# Patient Record
Sex: FEMALE | Race: BLACK OR AFRICAN AMERICAN | Employment: FULL TIME | URBAN - METROPOLITAN AREA
[De-identification: names, ages, dates, MRNs, and addresses within clinical notes are randomized per-mention and may not be internally consistent; named-entity substitution may affect disease eponyms.]

---

## 2018-11-23 ENCOUNTER — HOSPITAL ENCOUNTER (OUTPATIENT)
Dept: ULTRASOUND IMAGING | Age: 23
Discharge: HOME | End: 2018-11-23
Payer: COMMERCIAL

## 2018-11-23 DIAGNOSIS — N93.8 DUB (DYSFUNCTIONAL UTERINE BLEEDING): ICD-10-CM

## 2018-11-23 PROCEDURE — 76856 US EXAM PELVIC COMPLETE: CPT

## 2018-11-23 PROCEDURE — 76830 TRANSVAGINAL US NON-OB: CPT

## 2019-01-06 ENCOUNTER — APPOINTMENT (OUTPATIENT)
Dept: ULTRASOUND IMAGING | Age: 24
DRG: 743 | End: 2019-01-06
Attending: PHYSICIAN ASSISTANT
Payer: COMMERCIAL

## 2019-01-06 ENCOUNTER — HOSPITAL ENCOUNTER (INPATIENT)
Age: 24
LOS: 2 days | Discharge: HOME | DRG: 743 | End: 2019-01-10
Attending: EMERGENCY MEDICINE | Admitting: OBSTETRICS & GYNECOLOGY
Payer: COMMERCIAL

## 2019-01-06 DIAGNOSIS — R10.2 PELVIC PAIN: ICD-10-CM

## 2019-01-06 DIAGNOSIS — D25.9 UTERINE LEIOMYOMA, UNSPECIFIED LOCATION: Primary | ICD-10-CM

## 2019-01-06 DIAGNOSIS — N83.201 RIGHT OVARIAN CYST: ICD-10-CM

## 2019-01-06 LAB
BASOPHILS # BLD: 0 K/UL (ref 0–0.1)
BASOPHILS NFR BLD: 0 % (ref 0–1)
CLUE CELLS VAG QL WET PREP: NORMAL
COMMENT, HOLDF: NORMAL
DIFFERENTIAL METHOD BLD: NORMAL
EOSINOPHIL # BLD: 0.1 K/UL (ref 0–0.4)
EOSINOPHIL NFR BLD: 1 % (ref 0–7)
ERYTHROCYTE [DISTWIDTH] IN BLOOD BY AUTOMATED COUNT: 12.6 % (ref 11.5–14.5)
HCG SERPL QL: NEGATIVE
HCT VFR BLD AUTO: 37.6 % (ref 35–47)
HGB BLD-MCNC: 12.6 G/DL (ref 11.5–16)
IMM GRANULOCYTES # BLD: 0 K/UL (ref 0–0.04)
IMM GRANULOCYTES NFR BLD AUTO: 0 % (ref 0–0.5)
KOH PREP SPEC: NORMAL
LYMPHOCYTES # BLD: 2.6 K/UL (ref 0.8–3.5)
LYMPHOCYTES NFR BLD: 26 % (ref 12–49)
MCH RBC QN AUTO: 30.1 PG (ref 26–34)
MCHC RBC AUTO-ENTMCNC: 33.5 G/DL (ref 30–36.5)
MCV RBC AUTO: 89.7 FL (ref 80–99)
MONOCYTES # BLD: 0.6 K/UL (ref 0–1)
MONOCYTES NFR BLD: 6 % (ref 5–13)
NEUTS SEG # BLD: 6.6 K/UL (ref 1.8–8)
NEUTS SEG NFR BLD: 67 % (ref 32–75)
NRBC # BLD: 0 K/UL (ref 0–0.01)
NRBC BLD-RTO: 0 PER 100 WBC
PLATELET # BLD AUTO: 308 K/UL (ref 150–400)
PMV BLD AUTO: 9.6 FL (ref 8.9–12.9)
RBC # BLD AUTO: 4.19 M/UL (ref 3.8–5.2)
SAMPLES BEING HELD,HOLD: NORMAL
SERVICE CMNT-IMP: NORMAL
T VAGINALIS VAG QL WET PREP: NORMAL
WBC # BLD AUTO: 9.9 K/UL (ref 3.6–11)

## 2019-01-06 PROCEDURE — 74011250636 HC RX REV CODE- 250/636: Performed by: PHYSICIAN ASSISTANT

## 2019-01-06 PROCEDURE — 87210 SMEAR WET MOUNT SALINE/INK: CPT

## 2019-01-06 PROCEDURE — 96376 TX/PRO/DX INJ SAME DRUG ADON: CPT

## 2019-01-06 PROCEDURE — 87491 CHLMYD TRACH DNA AMP PROBE: CPT

## 2019-01-06 PROCEDURE — 74011000250 HC RX REV CODE- 250: Performed by: OBSTETRICS & GYNECOLOGY

## 2019-01-06 PROCEDURE — 99285 EMERGENCY DEPT VISIT HI MDM: CPT

## 2019-01-06 PROCEDURE — 85025 COMPLETE CBC W/AUTO DIFF WBC: CPT

## 2019-01-06 PROCEDURE — 96375 TX/PRO/DX INJ NEW DRUG ADDON: CPT

## 2019-01-06 PROCEDURE — 99218 HC RM OBSERVATION: CPT

## 2019-01-06 PROCEDURE — 96374 THER/PROPH/DIAG INJ IV PUSH: CPT

## 2019-01-06 PROCEDURE — 76856 US EXAM PELVIC COMPLETE: CPT

## 2019-01-06 PROCEDURE — 74011250636 HC RX REV CODE- 250/636: Performed by: OBSTETRICS & GYNECOLOGY

## 2019-01-06 PROCEDURE — 36415 COLL VENOUS BLD VENIPUNCTURE: CPT

## 2019-01-06 PROCEDURE — 74011250637 HC RX REV CODE- 250/637: Performed by: PHYSICIAN ASSISTANT

## 2019-01-06 PROCEDURE — 96361 HYDRATE IV INFUSION ADD-ON: CPT

## 2019-01-06 PROCEDURE — 84703 CHORIONIC GONADOTROPIN ASSAY: CPT

## 2019-01-06 RX ORDER — NORETHINDRONE ACETATE AND ETHINYL ESTRADIOL 1.5-30(21)
1 KIT ORAL DAILY
COMMUNITY

## 2019-01-06 RX ORDER — OXYCODONE AND ACETAMINOPHEN 5; 325 MG/1; MG/1
1 TABLET ORAL
Status: COMPLETED | OUTPATIENT
Start: 2019-01-06 | End: 2019-01-06

## 2019-01-06 RX ORDER — ONDANSETRON 4 MG/1
4 TABLET, ORALLY DISINTEGRATING ORAL
Status: ON HOLD | COMMUNITY
End: 2019-01-10 | Stop reason: SDUPTHER

## 2019-01-06 RX ORDER — KETOROLAC TROMETHAMINE 30 MG/ML
15 INJECTION, SOLUTION INTRAMUSCULAR; INTRAVENOUS
Status: COMPLETED | OUTPATIENT
Start: 2019-01-06 | End: 2019-01-06

## 2019-01-06 RX ORDER — KETOROLAC TROMETHAMINE 10 MG/1
10 TABLET, FILM COATED ORAL
Qty: 15 TAB | Refills: 0 | Status: SHIPPED | OUTPATIENT
Start: 2019-01-06

## 2019-01-06 RX ORDER — SODIUM CHLORIDE, SODIUM LACTATE, POTASSIUM CHLORIDE, CALCIUM CHLORIDE 600; 310; 30; 20 MG/100ML; MG/100ML; MG/100ML; MG/100ML
125 INJECTION, SOLUTION INTRAVENOUS CONTINUOUS
Status: DISCONTINUED | OUTPATIENT
Start: 2019-01-06 | End: 2019-01-09

## 2019-01-06 RX ORDER — HYDROCODONE BITARTRATE AND ACETAMINOPHEN 5; 325 MG/1; MG/1
1 TABLET ORAL
Qty: 12 TAB | Refills: 0 | Status: SHIPPED | OUTPATIENT
Start: 2019-01-06

## 2019-01-06 RX ORDER — HYDROMORPHONE HYDROCHLORIDE 2 MG/ML
1 INJECTION, SOLUTION INTRAMUSCULAR; INTRAVENOUS; SUBCUTANEOUS ONCE
Status: COMPLETED | OUTPATIENT
Start: 2019-01-06 | End: 2019-01-06

## 2019-01-06 RX ORDER — HYDROMORPHONE HYDROCHLORIDE 2 MG/ML
1 INJECTION, SOLUTION INTRAMUSCULAR; INTRAVENOUS; SUBCUTANEOUS
Status: DISCONTINUED | OUTPATIENT
Start: 2019-01-06 | End: 2019-01-10 | Stop reason: HOSPADM

## 2019-01-06 RX ORDER — DICYCLOMINE HYDROCHLORIDE 20 MG/1
20 TABLET ORAL
COMMUNITY

## 2019-01-06 RX ORDER — ONDANSETRON 2 MG/ML
4 INJECTION INTRAMUSCULAR; INTRAVENOUS
Status: DISCONTINUED | OUTPATIENT
Start: 2019-01-06 | End: 2019-01-09 | Stop reason: SDUPTHER

## 2019-01-06 RX ORDER — IBUPROFEN 400 MG/1
800 TABLET ORAL
Status: DISCONTINUED | OUTPATIENT
Start: 2019-01-06 | End: 2019-01-10 | Stop reason: HOSPADM

## 2019-01-06 RX ORDER — ONDANSETRON 2 MG/ML
4 INJECTION INTRAMUSCULAR; INTRAVENOUS
Status: COMPLETED | OUTPATIENT
Start: 2019-01-06 | End: 2019-01-06

## 2019-01-06 RX ORDER — MORPHINE SULFATE 2 MG/ML
4 INJECTION, SOLUTION INTRAMUSCULAR; INTRAVENOUS
Status: COMPLETED | OUTPATIENT
Start: 2019-01-06 | End: 2019-01-06

## 2019-01-06 RX ADMIN — SODIUM CHLORIDE, SODIUM LACTATE, POTASSIUM CHLORIDE, AND CALCIUM CHLORIDE 125 ML/HR: 600; 310; 30; 20 INJECTION, SOLUTION INTRAVENOUS at 18:14

## 2019-01-06 RX ADMIN — SODIUM CHLORIDE 1000 ML: 900 INJECTION, SOLUTION INTRAVENOUS at 16:20

## 2019-01-06 RX ADMIN — OXYCODONE AND ACETAMINOPHEN 1 TABLET: 5; 325 TABLET ORAL at 14:50

## 2019-01-06 RX ADMIN — HYDROMORPHONE HYDROCHLORIDE 1 MG: 2 INJECTION INTRAMUSCULAR; INTRAVENOUS; SUBCUTANEOUS at 23:32

## 2019-01-06 RX ADMIN — HYDROMORPHONE HYDROCHLORIDE 1 MG: 2 INJECTION INTRAMUSCULAR; INTRAVENOUS; SUBCUTANEOUS at 18:19

## 2019-01-06 RX ADMIN — ONDANSETRON 4 MG: 2 INJECTION INTRAMUSCULAR; INTRAVENOUS at 17:23

## 2019-01-06 RX ADMIN — ONDANSETRON 4 MG: 2 INJECTION INTRAMUSCULAR; INTRAVENOUS at 12:56

## 2019-01-06 RX ADMIN — HYDROMORPHONE HYDROCHLORIDE 1 MG: 2 INJECTION INTRAMUSCULAR; INTRAVENOUS; SUBCUTANEOUS at 15:38

## 2019-01-06 RX ADMIN — SODIUM CHLORIDE 1000 ML: 900 INJECTION, SOLUTION INTRAVENOUS at 13:05

## 2019-01-06 RX ADMIN — ONDANSETRON 4 MG: 2 INJECTION INTRAMUSCULAR; INTRAVENOUS at 23:33

## 2019-01-06 RX ADMIN — PROCHLORPERAZINE EDISYLATE 10 MG: 5 INJECTION INTRAMUSCULAR; INTRAVENOUS at 18:14

## 2019-01-06 RX ADMIN — KETOROLAC TROMETHAMINE 15 MG: 30 INJECTION, SOLUTION INTRAMUSCULAR at 12:56

## 2019-01-06 RX ADMIN — MORPHINE SULFATE 4 MG: 2 INJECTION, SOLUTION INTRAMUSCULAR; INTRAVENOUS at 12:56

## 2019-01-06 NOTE — PROGRESS NOTES
TRANSFER - IN REPORT: 
 
Verbal report received from [address] RN(name) on [facility] [employer]  being received from ED(unit) for routine progression of care Report consisted of patients Situation, Background, Assessment and  
Recommendations(SBAR). Information from the following report(s) SBAR, Kardex, ED Summary, Procedure Summary, Intake/Output, MAR, Recent Results and Med Rec Status was reviewed with the receiving nurse. Opportunity for questions and clarification was provided. Assessment completed upon patients arrival to unit and care assumed.

## 2019-01-06 NOTE — ED TRIAGE NOTES
Patient arrives c/o right sided abdominal pain since yesterday morning.  Patient states she was seen at a hospital in [facility] yesterday and diagnosed with an ovarian cyst.

## 2019-01-06 NOTE — ED NOTES
1250: Patient transported from [address] to core bed. Patient crying, writhing in pain in room. Given pain medication. 1300: Patient visibly calmer after pain medication administration, states \"morphine doesn't work for [employer] \"I feel like I can't move now but I am still in pain\". Placed on monitor. VSS. PA notified. 1400: Patient resting in room, family at bedside. 1450: Pain reassessed, given PO pain medication. 1500: Patient prepared for discharge. 1530: Patient crying in room, writhing in pain, says feels numb, yelling she is in pain. IV pain medication given. Patient to be admitted to hospital for pain control. 1535: Patient continuing to c/o pain, says her ears \"feel like I'm in a tunnel now\", c/o tingling. PA notified, will reassess. 1600: OB hospitalist at bedside for admission. Patient visibly calmer and able to answer hospitalist questions appropriately. 1700: Patient calm in bed, talking on phone. Says has had one episode of emesis, given zofran. Provided with apple sauce and beverage. 1800: Patient c/o continued nausea and vomiting despite the zofran. Hospitalist paged. 1815: Patient given additional nausea medication (See MAR) Patient says \"I cant focus on anything other than this nause but I dont know if I want pain meds because they will make me nauseous. Oh I guess I will take them\". See MAR. TRANSFER - OUT REPORT: 
 
Verbal report given to [place], RN(name) on [employer]  being transferred to [facility](unit) for routine progression of care Report consisted of patients Situation, Background, Assessment and  
Recommendations(SBAR). Information from the following report(s) SBAR, ED Summary, Intake/Output, MAR and Recent Results was reviewed with the receiving nurse. Lines:  
Peripheral IV 01/06/19 Left Antecubital (Active) Opportunity for questions and clarification was provided. Patient transported with: 
 [employer]

## 2019-01-06 NOTE — ED PROVIDER NOTES
20year old female no medical hx presenting for abdominal pain. Pt notes that she had a known ovarian cyst.  Was seen in the ED in [place] yesterday. Had US, CT scan, UA, blood work, had CT showing normal appendix, mild leukocytosis, US showing right ovarian cyst with good flow and uterine fibroid. Pt notes that she had similar bouts of pain around Salvador Eve. Pt notes that current bout of pain started yesterday  AM at about 0100. Pt notes that pain is right pelvic in location, \"like a ba\d prolonged period cramp. \"  + N/V. Sent home with pain and nausea medications, not helping. LMP: 2-3 weeks ago. PMHx: denies Social: non-smoker. Works in [place]. History reviewed. No pertinent past medical history. History reviewed. No pertinent surgical history. History reviewed. No pertinent family history. Social History Socioeconomic History  Marital status: SINGLE Spouse name: Not on file  Number of children: Not on file  Years of education: Not on file  Highest education level: Not on file Social Needs  Financial resource strain: Not on file  Food insecurity - worry: Not on file  Food insecurity - inability: Not on file  Transportation needs - medical: Not on file  Transportation needs - non-medical: Not on file Occupational History  Not on file Tobacco Use  Smoking status: Never Smoker  Smokeless tobacco: Never Used Substance and Sexual Activity  Alcohol use: No  
  Frequency: Never  Drug use: Not on file  Sexual activity: Not on file Other Topics Concern  Not on file Social History Narrative  Not on file ALLERGIES: Patient has no known allergies. Review of Systems Constitutional: Negative for fever. HENT: Negative for congestion. Eyes: Negative for redness. Respiratory: Negative for shortness of breath. Cardiovascular: Negative for chest pain. Gastrointestinal: Positive for nausea and vomiting. Negative for diarrhea. Genitourinary: Positive for pelvic pain. Skin: Negative for wound. Neurological: Negative for syncope. All other systems reviewed and are negative. Vitals:  
 01/06/19 1123 01/06/19 1205 01/06/19 1400 01/06/19 1500 BP:  125/84 114/75 (!) 141/93 Pulse:  97 Resp: 18 18 Temp:  98.3 °F (36.8 °C) SpO2: 98% 95% 99% 99% Physical Exam  
Constitutional: She appears well-developed and well-nourished. No distress. Pleasant AA female, appears very uncomfortable HENT:  
Right Ear: External ear normal.  
Left Ear: External ear normal.  
Eyes: Pupils are equal, round, and reactive to light. Neck: Normal range of motion. Neck supple. Cardiovascular: Normal rate, regular rhythm and normal heart sounds. Exam reveals no gallop and no friction rub. No murmur heard. Pulmonary/Chest: Effort normal and breath sounds normal. No respiratory distress. She has no wheezes. Abdominal: Soft. There is no tenderness. There is no guarding.  
+ right pelvic TTP Genitourinary:  
Genitourinary Comments: Pt barely able to tolerate speculum or bimanual exam 
Slight view of cervix with tiny amount of discharge, no significant purulent discharge Difficulty assessing CMT vs. uterine TTP related to fibroid + right adnexal TTP Musculoskeletal: Normal range of motion. Neurological: She is alert. Skin: Skin is warm and dry. Psychiatric: She has a normal mood and affect. Nursing note and vitals reviewed. MDM Number of Diagnoses or Management Options Right ovarian cyst:  
Uterine leiomyoma, unspecified location:  
Diagnosis management comments: 20year old female presenting to the ED for recurrent, intractable right pelvic pain. Seen at an ED in [facility] last night, diagnosed with fibroid, right ovarian cyst.  Presenting today for worsening pain. No fever, etc. c/f infection.   Today with significant pain, intractable after treatment with toradol, morphine, percocet, dilaudid. No evidence of torsion on exam, however pt with severe intractable pain, admitted for pain control. Amount and/or Complexity of Data Reviewed Clinical lab tests: ordered and reviewed Tests in the radiology section of CPT®: ordered and reviewed Obtain history from someone other than the patient: yes (Family at bedside) Review and summarize past medical records: yes Discuss the patient with other providers: yes (Dr. Jeovanny Roach, ED attending. Dr. Mehrdad Narayan, GYN. Dr. Shayla Pozo, [facility] hospitalist) Critical Care Total time providing critical care: 30-74 minutes (Total critical care time spent exclusive of procedures:  45 minutes - severe intractable pain, multiple reassessments,  review of past medical records, multiple consults, admission ) Procedures Discussed with Dr. Mehrdad Narayan, GYN. Noted very low suspicion of torsion given size of cyst, good flow on US. Noted possible degenerating fibroid causing pain. Agrees with NSAID, pain medicine, d/c home with close follow up. HELIO Pop 
1:52 PM 
 
Pt reassessed, having some recurrent pain, will give PO dose of pain medicine. HELIO Pop 
2:45 PM 
 
 
While gathering discharge papers, heard crying and moaning coming from patients room, dad approached to note that pt is again having severe pain. Will call GYN and admit. HELIO Pop 
3:17 PM 
 
Discussed case and management with [facility] hospitalist.  Will call Dr. Mehrdad Narayan.  
HELIO Pop 
3:45 PM

## 2019-01-06 NOTE — PROGRESS NOTES
Admission Medication Reconciliation: 
 
Information obtained from: Patient and QLCTMPQ Significant PMH/Disease States:  
History reviewed. No pertinent past medical history. Chief Complaint for this Admission: Chief Complaint Patient presents with  Abdominal Pain Allergies:  Patient has no known allergies. Prior to Admission Medications:  
Prior to Admission Medications Prescriptions Last Dose Informant Patient Reported? Taking?  
dicyclomine (BENTYL) 20 mg tablet 1/6/2019 at Unknown time  Yes Yes Sig: Take 20 mg by mouth every six (6) hours as needed. norethindrone-ethinyl estradiol-iron (JUNEL FE 1.5/30, 28,) 1.5 mg-30 mcg (21)/75 mg (7) tab 1/6/2019 at Unknown time  Yes Yes Sig: Take 1 Tab by mouth daily. ondansetron (ZOFRAN ODT) 4 mg disintegrating tablet 1/6/2019 at Unknown time  Yes Yes Sig: Take 4 mg by mouth every four (4) hours as needed for Nausea. Facility-Administered Medications: None Comments/Recommendations: Added: Junel, ondansetron, dicyclomine Removed: None Changed: None Please call [employer] phone () or [employer]  [phone]. () if any questions or concerns. Thank you, Yelena Meléndez, KameronD

## 2019-01-06 NOTE — H&P
Gynecology History and Physical 
 
Name: David Hutchinson MRN: [id number] SSN: [id number] YOB: 1995  Age: 20 y.o. Sex: female Subjective: Chief complaint:  Fibroids and Pelvic pain Pierre OVERTON is a 20 y. o.  female with a history of uterine fibroids presented to the ER with the C/O intense pelvic pain worsening over the day. Patient describes it like a throbbing pain in the pelvic area and more on the right side. States the pain started 1 am the night before and she was seen in the ER at [place]. Appendicitis was ruled on the CT scan and 2.5 cm right ovarian cyst with small amount of free pelvic fluid noted along with uterine fibroids. She was managed with pain meds and was discharged home. This morning her pain returned which prompted her to return to the ER here. Had nausea, no vomiting, no fever with chills. Here in the ER she was treated with IV toradol and IV dilaudid which helped with pain and she was ready to be discharged home. Discharge was held since the pain returned requiring more IV pain meds. Patient mentions that she has been recently diagnosed with uterine fibroids and was evaluated by Dr. Maci Pacheco in the office on 12/13. Describes 2 similar episodes of pain which had spontaneous resolution. She has been on OCPs for the last year since she had been on acutane which she has recently stopped but continues to be on the OCPs. Describes her menstrual cycles as regular lasting 4-5 days except for a prolonged cycle she had in Dec lasting 2 weeks that;s when she was diagnosed with uterine fibroids. Significant Family history of Uterine Fibroids. Gyn History : denies any H.O PID /STDs Ultrasound findings include INDICATION: Right-sided pelvic pain  
  
 
  
TECHNIQUE: Routine transpelvic ultrasound images were obtained. 
  
FINDINGS: The images demonstrate an incompletely distended urinary bladder.  The 
 uterus measures 10.6 x 10.4 x 7.4 cm. There is a 7.6 x 6.9 x 6.8 cm fibroid in 
the uterine fundus; this obscures the endometrial stripe. The right ovary 
measures 5.4 x 3.2 x 2.9 cm and contains a 2.6 cm cyst; it demonstrates normal 
vascularity on color Doppler imaging. The left ovary is obscured by overlying 
bowel gas. There is no pelvic free fluid. The patient refused transvaginal 
imaging. 
  
IMPRESSION IMPRESSION: 
Enlarged uterus with dominant 7.6 cm fibroid in the uterine fundus. Nonvisualization of the endometrial stripe and left ovary. Small right ovarian 
cyst is likely physiologic. 
  
The current method of family planning is OCPs OB History Nulligravida CT Abdomen/Pelvis W/ Contrast done at the ER at [place] on 1/5/19 CT Abdomen/Pelvis W/ Contrast  
Impressions Performed At IMPRESSION:   
1. Mild enlargement of the right ovary with right ovarian cyst. If torsion is a concern, this could be further assessed with ultrasound.   
2. Small amount of pelvic fluid may be secondary to cyst rupture.   
3. Uterine myomatous changes.   
4. No evidence of appendicitis.   
   
Images and interpretation personally reviewed by: Beverly Solo MD     
 
FINDINGS: Transabdominal scanning was performed in the ER at [place] on 1/5/19. The uterus measures 10.4 x 7.1 x 7.5 cm. There is a broad-based subserosal fibroid arising from the anterior fundus measuring 7.9 x 7.6 x 7.9 cm. The endometrial lining is not well visualized. The right ovary measures 6.0 x 3.8 x 3.0 cm. There is a 3.4 cm right ovarian simple cyst. Normal vascular flow to the right ovary. Left ovary measures 4.6 x 3.1 x 1.9 cm and is normal with normal vascular flow. No free pelvic fluid. IMPRESSION: 
1. Small right ovarian cyst.. 2. Uterine fibroid. Images and interpretation personally reviewed by: Beverly Solo MD 
 
History reviewed. No pertinent past medical history. History reviewed. No pertinent surgical history. Social History Occupational History  Not on file Tobacco Use  Smoking status: Never Smoker  Smokeless tobacco: Never Used Substance and Sexual Activity  Alcohol use: No  
  Frequency: Never  Drug use: Not on file  Sexual activity: Not on file History reviewed. No pertinent family history. No Known Allergies Prior to Admission medications Medication Sig Start Date End Date Taking? Authorizing Provider HYDROcodone-acetaminophen (NORCO) 5-325 mg per tablet Take 1 Tab by mouth every four (4) hours as needed for Pain. Max Daily Amount: 6 Tabs. 1/6/19  Yes HELIO Segovia  
ketorolac (TORADOL) 10 mg tablet Take 1 Tab by mouth every six (6) hours as needed for Pain. 1/6/19  Yes HELIO Segovia Review of Systems A comprehensive review of systems was negative except for that written in the History of Present Illness. Objective:  
 
Vitals:  
 01/06/19 1123 01/06/19 1205 01/06/19 1400 01/06/19 1500 BP:  125/84 114/75 (!) 141/93 Pulse:  97 Resp: 18 18 Temp:  98.3 °F (36.8 °C) SpO2: 98% 95% 99% 99% Physical Exam: 
Patient without distress. Heart: Regular rate and rhythm Lung: normal respiratory effort Abdomen: soft, + tenderness to deep palpation in the lower abdomen, No rebound, no guarding External Genitalia: Declined pelvic exam. As pelvic exam was already attempted by the ER provider which the patient did not tolerate. Assessment/Plan:  
 
Pelvic pain Right Ovarian cyst 
Degenerating Myoma The Plausible causes of Pelvic pain was discussed with Ms. Jeovany Merchant and her family at the bedside. Will admit for pain management IV hydration IV/PO pain meds Signed By:  Yayo Medina MD   
 January 6, 2019

## 2019-01-07 PROCEDURE — 99218 HC RM OBSERVATION: CPT

## 2019-01-07 PROCEDURE — 74011250636 HC RX REV CODE- 250/636: Performed by: OBSTETRICS & GYNECOLOGY

## 2019-01-07 RX ORDER — NORETHINDRONE ACETATE AND ETHINYL ESTRADIOL 1.5-30(21)
1 KIT ORAL DAILY
Status: DISCONTINUED | OUTPATIENT
Start: 2019-01-07 | End: 2019-01-10 | Stop reason: HOSPADM

## 2019-01-07 RX ORDER — KETOROLAC TROMETHAMINE 30 MG/ML
30 INJECTION, SOLUTION INTRAMUSCULAR; INTRAVENOUS EVERY 6 HOURS
Status: DISPENSED | OUTPATIENT
Start: 2019-01-07 | End: 2019-01-09

## 2019-01-07 RX ADMIN — ONDANSETRON 4 MG: 2 INJECTION INTRAMUSCULAR; INTRAVENOUS at 22:08

## 2019-01-07 RX ADMIN — SODIUM CHLORIDE, SODIUM LACTATE, POTASSIUM CHLORIDE, AND CALCIUM CHLORIDE 125 ML/HR: 600; 310; 30; 20 INJECTION, SOLUTION INTRAVENOUS at 18:23

## 2019-01-07 RX ADMIN — KETOROLAC TROMETHAMINE 30 MG: 30 INJECTION, SOLUTION INTRAMUSCULAR at 22:07

## 2019-01-07 RX ADMIN — KETOROLAC TROMETHAMINE 30 MG: 30 INJECTION, SOLUTION INTRAMUSCULAR at 09:02

## 2019-01-07 RX ADMIN — ONDANSETRON 4 MG: 2 INJECTION INTRAMUSCULAR; INTRAVENOUS at 06:13

## 2019-01-07 RX ADMIN — NORETHINDRONE ACETATE AND ETHINYL ESTRADIOL 1 TABLET: KIT at 18:46

## 2019-01-07 RX ADMIN — HYDROMORPHONE HYDROCHLORIDE 1 MG: 2 INJECTION INTRAMUSCULAR; INTRAVENOUS; SUBCUTANEOUS at 06:48

## 2019-01-07 RX ADMIN — ONDANSETRON 4 MG: 2 INJECTION INTRAMUSCULAR; INTRAVENOUS at 10:26

## 2019-01-07 NOTE — PROGRESS NOTES
Bedside and Verbal shift change report given to Becky Mason (oncoming nurse) by Johnny Woods RN (offgoing nurse). Report included the following information SBAR, Kardex, Intake/Output, MAR and Recent Results.

## 2019-01-07 NOTE — PROGRESS NOTES
Gynecology Progress Note Patient still c/o pelvic pain, mostly RLQ. Improved with meds but pain has been up and down. Overall about the same as yesterday. Has had some nausea and dizziness from narcotics Vitals:  Blood pressure 112/77, pulse 86, temperature 98 °F (36.7 °C), resp. rate 16, SpO2 98 %. Temp (24hrs), Av.1 °F (36.7 °C), Min:97.9 °F (36.6 °C), Max:98.3 °F (36.8 °C) Exam:  Patient without distress, currently somewhat groggy after recent dilaudid. Abdomen soft,  Moderately tender RLQ and mid pelvis, No rebound Lower extremities are negative for swelling, cords, or tenderness. Lab/Data Review: All lab results for the last 24 hours reviewed. Assessment and Plan:   
24yo G0 with pelvic pain, fibroids, and new small right ovarian cyst.  Pain may be due to either or both. Her dominant fibroid is in the fundus which makes me believe that at least some of her pain is due to the ovarian cyst since she describes pain as being on the right. Will add Toradol, scheduled. Introduce liquids as elizabeth. NPO tonight in case of need for surgery. Discussed plan with pt and mother.

## 2019-01-08 ENCOUNTER — ANESTHESIA (OUTPATIENT)
Dept: SURGERY | Age: 24
DRG: 743 | End: 2019-01-08
Payer: COMMERCIAL

## 2019-01-08 ENCOUNTER — ANESTHESIA EVENT (OUTPATIENT)
Dept: SURGERY | Age: 24
DRG: 743 | End: 2019-01-08
Payer: COMMERCIAL

## 2019-01-08 PROBLEM — D21.9 FIBROID: Status: ACTIVE | Noted: 2019-01-08

## 2019-01-08 LAB
ABO + RH BLD: NORMAL
BLOOD GROUP ANTIBODIES SERPL: NORMAL
SPECIMEN EXP DATE BLD: NORMAL

## 2019-01-08 PROCEDURE — 74011250636 HC RX REV CODE- 250/636: Performed by: OBSTETRICS & GYNECOLOGY

## 2019-01-08 PROCEDURE — 77030039266 HC ADH SKN EXOFIN S2SG -A: Performed by: OBSTETRICS & GYNECOLOGY

## 2019-01-08 PROCEDURE — 77030018836 HC SOL IRR NACL ICUM -A: Performed by: OBSTETRICS & GYNECOLOGY

## 2019-01-08 PROCEDURE — 77030032490 HC SLV COMPR SCD KNE COVD -B: Performed by: OBSTETRICS & GYNECOLOGY

## 2019-01-08 PROCEDURE — 77030011640 HC PAD GRND REM COVD -A: Performed by: OBSTETRICS & GYNECOLOGY

## 2019-01-08 PROCEDURE — 77030002933 HC SUT MCRYL J&J -A: Performed by: OBSTETRICS & GYNECOLOGY

## 2019-01-08 PROCEDURE — 76210000016 HC OR PH I REC 1 TO 1.5 HR: Performed by: OBSTETRICS & GYNECOLOGY

## 2019-01-08 PROCEDURE — 77030026438 HC STYL ET INTUB CARD -A: Performed by: ANESTHESIOLOGY

## 2019-01-08 PROCEDURE — 76060000036 HC ANESTHESIA 2.5 TO 3 HR: Performed by: OBSTETRICS & GYNECOLOGY

## 2019-01-08 PROCEDURE — 77030034696 HC CATH URETH FOL 2W BARD -A: Performed by: OBSTETRICS & GYNECOLOGY

## 2019-01-08 PROCEDURE — 3E0P05Z INTRODUCTION OF ADHESION BARRIER INTO FEMALE REPRODUCTIVE, OPEN APPROACH: ICD-10-PCS | Performed by: OBSTETRICS & GYNECOLOGY

## 2019-01-08 PROCEDURE — 77030002888 HC SUT CHRMC J&J -A: Performed by: OBSTETRICS & GYNECOLOGY

## 2019-01-08 PROCEDURE — 74011250637 HC RX REV CODE- 250/637

## 2019-01-08 PROCEDURE — 77030031139 HC SUT VCRL2 J&J -A: Performed by: OBSTETRICS & GYNECOLOGY

## 2019-01-08 PROCEDURE — 65410000002 HC RM PRIVATE OB

## 2019-01-08 PROCEDURE — 74011250636 HC RX REV CODE- 250/636

## 2019-01-08 PROCEDURE — 74011250637 HC RX REV CODE- 250/637: Performed by: OBSTETRICS & GYNECOLOGY

## 2019-01-08 PROCEDURE — 76010000131 HC OR TIME 2 TO 2.5 HR: Performed by: OBSTETRICS & GYNECOLOGY

## 2019-01-08 PROCEDURE — C1765 ADHESION BARRIER: HCPCS | Performed by: OBSTETRICS & GYNECOLOGY

## 2019-01-08 PROCEDURE — 0UB90ZZ EXCISION OF UTERUS, OPEN APPROACH: ICD-10-PCS | Performed by: OBSTETRICS & GYNECOLOGY

## 2019-01-08 PROCEDURE — 86900 BLOOD TYPING SEROLOGIC ABO: CPT

## 2019-01-08 PROCEDURE — 74011000250 HC RX REV CODE- 250

## 2019-01-08 PROCEDURE — 99218 HC RM OBSERVATION: CPT

## 2019-01-08 PROCEDURE — 74011250636 HC RX REV CODE- 250/636: Performed by: ANESTHESIOLOGY

## 2019-01-08 PROCEDURE — 74011000258 HC RX REV CODE- 258: Performed by: OBSTETRICS & GYNECOLOGY

## 2019-01-08 PROCEDURE — 77030020782 HC GWN BAIR PAWS FLX 3M -B

## 2019-01-08 PROCEDURE — 77030008684 HC TU ET CUF COVD -B: Performed by: ANESTHESIOLOGY

## 2019-01-08 PROCEDURE — 77030018846 HC SOL IRR STRL H20 ICUM -A: Performed by: OBSTETRICS & GYNECOLOGY

## 2019-01-08 PROCEDURE — 88305 TISSUE EXAM BY PATHOLOGIST: CPT

## 2019-01-08 PROCEDURE — 36415 COLL VENOUS BLD VENIPUNCTURE: CPT

## 2019-01-08 RX ORDER — CEFAZOLIN SODIUM/WATER 2 G/20 ML
2 SYRINGE (ML) INTRAVENOUS ONCE
Status: COMPLETED | OUTPATIENT
Start: 2019-01-08 | End: 2019-01-08

## 2019-01-08 RX ORDER — DOCUSATE SODIUM 100 MG/1
100 CAPSULE, LIQUID FILLED ORAL 2 TIMES DAILY
Status: DISCONTINUED | OUTPATIENT
Start: 2019-01-08 | End: 2019-01-10 | Stop reason: HOSPADM

## 2019-01-08 RX ORDER — ACETAMINOPHEN 10 MG/ML
INJECTION, SOLUTION INTRAVENOUS AS NEEDED
Status: DISCONTINUED | OUTPATIENT
Start: 2019-01-08 | End: 2019-01-08 | Stop reason: HOSPADM

## 2019-01-08 RX ORDER — SCOLOPAMINE TRANSDERMAL SYSTEM 1 MG/1
PATCH, EXTENDED RELEASE TRANSDERMAL AS NEEDED
Status: DISCONTINUED | OUTPATIENT
Start: 2019-01-08 | End: 2019-01-08 | Stop reason: HOSPADM

## 2019-01-08 RX ORDER — MIDAZOLAM HYDROCHLORIDE 1 MG/ML
1 INJECTION, SOLUTION INTRAMUSCULAR; INTRAVENOUS AS NEEDED
Status: DISCONTINUED | OUTPATIENT
Start: 2019-01-08 | End: 2019-01-08 | Stop reason: HOSPADM

## 2019-01-08 RX ORDER — MORPHINE SULFATE IN 0.9 % NACL 150MG/30ML
PATIENT CONTROLLED ANALGESIA SYRINGE INTRAVENOUS CONTINUOUS
Status: DISCONTINUED | OUTPATIENT
Start: 2019-01-08 | End: 2019-01-08 | Stop reason: SINTOL

## 2019-01-08 RX ORDER — PROPOFOL 10 MG/ML
INJECTION, EMULSION INTRAVENOUS AS NEEDED
Status: DISCONTINUED | OUTPATIENT
Start: 2019-01-08 | End: 2019-01-08 | Stop reason: HOSPADM

## 2019-01-08 RX ORDER — EPHEDRINE SULFATE 50 MG/ML
5 INJECTION, SOLUTION INTRAVENOUS AS NEEDED
Status: DISCONTINUED | OUTPATIENT
Start: 2019-01-08 | End: 2019-01-08 | Stop reason: HOSPADM

## 2019-01-08 RX ORDER — ROPIVACAINE HYDROCHLORIDE 5 MG/ML
30 INJECTION, SOLUTION EPIDURAL; INFILTRATION; PERINEURAL AS NEEDED
Status: DISCONTINUED | OUTPATIENT
Start: 2019-01-08 | End: 2019-01-08 | Stop reason: HOSPADM

## 2019-01-08 RX ORDER — FENTANYL CITRATE 50 UG/ML
INJECTION, SOLUTION INTRAMUSCULAR; INTRAVENOUS AS NEEDED
Status: DISCONTINUED | OUTPATIENT
Start: 2019-01-08 | End: 2019-01-08 | Stop reason: HOSPADM

## 2019-01-08 RX ORDER — ROCURONIUM BROMIDE 10 MG/ML
INJECTION, SOLUTION INTRAVENOUS AS NEEDED
Status: DISCONTINUED | OUTPATIENT
Start: 2019-01-08 | End: 2019-01-08 | Stop reason: HOSPADM

## 2019-01-08 RX ORDER — FENTANYL CITRATE 50 UG/ML
50 INJECTION, SOLUTION INTRAMUSCULAR; INTRAVENOUS AS NEEDED
Status: DISCONTINUED | OUTPATIENT
Start: 2019-01-08 | End: 2019-01-08 | Stop reason: HOSPADM

## 2019-01-08 RX ORDER — MIDAZOLAM HYDROCHLORIDE 1 MG/ML
0.5 INJECTION, SOLUTION INTRAMUSCULAR; INTRAVENOUS
Status: COMPLETED | OUTPATIENT
Start: 2019-01-08 | End: 2019-01-08

## 2019-01-08 RX ORDER — ONDANSETRON 2 MG/ML
4 INJECTION INTRAMUSCULAR; INTRAVENOUS
Status: DISCONTINUED | OUTPATIENT
Start: 2019-01-08 | End: 2019-01-10 | Stop reason: HOSPADM

## 2019-01-08 RX ORDER — SODIUM CHLORIDE 9 MG/ML
25 INJECTION, SOLUTION INTRAVENOUS CONTINUOUS
Status: DISCONTINUED | OUTPATIENT
Start: 2019-01-08 | End: 2019-01-08 | Stop reason: HOSPADM

## 2019-01-08 RX ORDER — MIDAZOLAM HYDROCHLORIDE 1 MG/ML
INJECTION, SOLUTION INTRAMUSCULAR; INTRAVENOUS AS NEEDED
Status: DISCONTINUED | OUTPATIENT
Start: 2019-01-08 | End: 2019-01-08 | Stop reason: HOSPADM

## 2019-01-08 RX ORDER — KETOROLAC TROMETHAMINE 30 MG/ML
15 INJECTION, SOLUTION INTRAMUSCULAR; INTRAVENOUS
Status: ACTIVE | OUTPATIENT
Start: 2019-01-08 | End: 2019-01-09

## 2019-01-08 RX ORDER — LIDOCAINE HYDROCHLORIDE 10 MG/ML
0.1 INJECTION, SOLUTION EPIDURAL; INFILTRATION; INTRACAUDAL; PERINEURAL AS NEEDED
Status: DISCONTINUED | OUTPATIENT
Start: 2019-01-08 | End: 2019-01-08 | Stop reason: HOSPADM

## 2019-01-08 RX ORDER — MORPHINE SULFATE 10 MG/ML
2 INJECTION, SOLUTION INTRAMUSCULAR; INTRAVENOUS
Status: DISCONTINUED | OUTPATIENT
Start: 2019-01-08 | End: 2019-01-08 | Stop reason: HOSPADM

## 2019-01-08 RX ORDER — SODIUM CHLORIDE 0.9 % (FLUSH) 0.9 %
5-40 SYRINGE (ML) INJECTION EVERY 8 HOURS
Status: DISCONTINUED | OUTPATIENT
Start: 2019-01-08 | End: 2019-01-10 | Stop reason: HOSPADM

## 2019-01-08 RX ORDER — SODIUM CHLORIDE, SODIUM LACTATE, POTASSIUM CHLORIDE, CALCIUM CHLORIDE 600; 310; 30; 20 MG/100ML; MG/100ML; MG/100ML; MG/100ML
1000 INJECTION, SOLUTION INTRAVENOUS CONTINUOUS
Status: DISCONTINUED | OUTPATIENT
Start: 2019-01-08 | End: 2019-01-08 | Stop reason: HOSPADM

## 2019-01-08 RX ORDER — SODIUM CHLORIDE 9 MG/ML
250 INJECTION, SOLUTION INTRAVENOUS AS NEEDED
Status: DISCONTINUED | OUTPATIENT
Start: 2019-01-08 | End: 2019-01-10 | Stop reason: HOSPADM

## 2019-01-08 RX ORDER — ONDANSETRON 2 MG/ML
INJECTION INTRAMUSCULAR; INTRAVENOUS AS NEEDED
Status: DISCONTINUED | OUTPATIENT
Start: 2019-01-08 | End: 2019-01-08 | Stop reason: HOSPADM

## 2019-01-08 RX ORDER — KETOROLAC TROMETHAMINE 30 MG/ML
INJECTION, SOLUTION INTRAMUSCULAR; INTRAVENOUS AS NEEDED
Status: DISCONTINUED | OUTPATIENT
Start: 2019-01-08 | End: 2019-01-08 | Stop reason: HOSPADM

## 2019-01-08 RX ORDER — SODIUM CHLORIDE 0.9 % (FLUSH) 0.9 %
5-40 SYRINGE (ML) INJECTION AS NEEDED
Status: DISCONTINUED | OUTPATIENT
Start: 2019-01-08 | End: 2019-01-10 | Stop reason: HOSPADM

## 2019-01-08 RX ORDER — ALBUTEROL SULFATE 0.83 MG/ML
2.5 SOLUTION RESPIRATORY (INHALATION) AS NEEDED
Status: DISCONTINUED | OUTPATIENT
Start: 2019-01-08 | End: 2019-01-08 | Stop reason: HOSPADM

## 2019-01-08 RX ORDER — LIDOCAINE HYDROCHLORIDE 20 MG/ML
INJECTION, SOLUTION EPIDURAL; INFILTRATION; INTRACAUDAL; PERINEURAL AS NEEDED
Status: DISCONTINUED | OUTPATIENT
Start: 2019-01-08 | End: 2019-01-08 | Stop reason: HOSPADM

## 2019-01-08 RX ORDER — GLYCOPYRROLATE 0.2 MG/ML
0.2 INJECTION INTRAMUSCULAR; INTRAVENOUS
Status: DISCONTINUED | OUTPATIENT
Start: 2019-01-08 | End: 2019-01-08 | Stop reason: HOSPADM

## 2019-01-08 RX ORDER — CEFAZOLIN SODIUM/WATER 2 G/20 ML
2 SYRINGE (ML) INTRAVENOUS
Status: COMPLETED | OUTPATIENT
Start: 2019-01-08 | End: 2019-01-08

## 2019-01-08 RX ORDER — DIPHENHYDRAMINE HCL 25 MG
25 CAPSULE ORAL
Status: DISCONTINUED | OUTPATIENT
Start: 2019-01-08 | End: 2019-01-10 | Stop reason: HOSPADM

## 2019-01-08 RX ORDER — SUCCINYLCHOLINE CHLORIDE 20 MG/ML
INJECTION INTRAMUSCULAR; INTRAVENOUS AS NEEDED
Status: DISCONTINUED | OUTPATIENT
Start: 2019-01-08 | End: 2019-01-08 | Stop reason: HOSPADM

## 2019-01-08 RX ORDER — NEOSTIGMINE METHYLSULFATE 1 MG/ML
INJECTION INTRAVENOUS AS NEEDED
Status: DISCONTINUED | OUTPATIENT
Start: 2019-01-08 | End: 2019-01-08 | Stop reason: HOSPADM

## 2019-01-08 RX ORDER — HYDROCODONE BITARTRATE AND ACETAMINOPHEN 5; 325 MG/1; MG/1
1 TABLET ORAL AS NEEDED
Status: DISCONTINUED | OUTPATIENT
Start: 2019-01-08 | End: 2019-01-08 | Stop reason: HOSPADM

## 2019-01-08 RX ORDER — ONDANSETRON 2 MG/ML
4 INJECTION INTRAMUSCULAR; INTRAVENOUS AS NEEDED
Status: DISCONTINUED | OUTPATIENT
Start: 2019-01-08 | End: 2019-01-08 | Stop reason: HOSPADM

## 2019-01-08 RX ORDER — SODIUM CHLORIDE, SODIUM LACTATE, POTASSIUM CHLORIDE, CALCIUM CHLORIDE 600; 310; 30; 20 MG/100ML; MG/100ML; MG/100ML; MG/100ML
INJECTION, SOLUTION INTRAVENOUS
Status: DISCONTINUED | OUTPATIENT
Start: 2019-01-08 | End: 2019-01-08 | Stop reason: HOSPADM

## 2019-01-08 RX ORDER — GLYCOPYRROLATE 0.2 MG/ML
INJECTION INTRAMUSCULAR; INTRAVENOUS AS NEEDED
Status: DISCONTINUED | OUTPATIENT
Start: 2019-01-08 | End: 2019-01-08 | Stop reason: HOSPADM

## 2019-01-08 RX ORDER — DEXAMETHASONE SODIUM PHOSPHATE 4 MG/ML
INJECTION, SOLUTION INTRA-ARTICULAR; INTRALESIONAL; INTRAMUSCULAR; INTRAVENOUS; SOFT TISSUE AS NEEDED
Status: DISCONTINUED | OUTPATIENT
Start: 2019-01-08 | End: 2019-01-08 | Stop reason: HOSPADM

## 2019-01-08 RX ORDER — FENTANYL CITRATE 50 UG/ML
25 INJECTION, SOLUTION INTRAMUSCULAR; INTRAVENOUS
Status: COMPLETED | OUTPATIENT
Start: 2019-01-08 | End: 2019-01-08

## 2019-01-08 RX ORDER — DEXMEDETOMIDINE HYDROCHLORIDE 4 UG/ML
INJECTION, SOLUTION INTRAVENOUS AS NEEDED
Status: DISCONTINUED | OUTPATIENT
Start: 2019-01-08 | End: 2019-01-08 | Stop reason: HOSPADM

## 2019-01-08 RX ADMIN — SUCCINYLCHOLINE CHLORIDE 120 MG: 20 INJECTION INTRAMUSCULAR; INTRAVENOUS at 15:43

## 2019-01-08 RX ADMIN — MIDAZOLAM 0.5 MG: 1 INJECTION INTRAMUSCULAR; INTRAVENOUS at 19:34

## 2019-01-08 RX ADMIN — Medication 2 G: at 08:35

## 2019-01-08 RX ADMIN — SODIUM CHLORIDE, SODIUM LACTATE, POTASSIUM CHLORIDE, AND CALCIUM CHLORIDE 125 ML/HR: 600; 310; 30; 20 INJECTION, SOLUTION INTRAVENOUS at 03:18

## 2019-01-08 RX ADMIN — NORETHINDRONE ACETATE AND ETHINYL ESTRADIOL 1 TABLET: KIT at 08:35

## 2019-01-08 RX ADMIN — MORPHINE SULFATE 2 MG: 10 INJECTION INTRAVENOUS at 19:30

## 2019-01-08 RX ADMIN — HYDROMORPHONE HYDROCHLORIDE 1 MG: 2 INJECTION INTRAMUSCULAR; INTRAVENOUS; SUBCUTANEOUS at 23:14

## 2019-01-08 RX ADMIN — ONDANSETRON 4 MG: 2 INJECTION INTRAMUSCULAR; INTRAVENOUS at 23:52

## 2019-01-08 RX ADMIN — ROCURONIUM BROMIDE 5 MG: 10 INJECTION, SOLUTION INTRAVENOUS at 15:42

## 2019-01-08 RX ADMIN — PROPOFOL 200 MG: 10 INJECTION, EMULSION INTRAVENOUS at 15:42

## 2019-01-08 RX ADMIN — FENTANYL CITRATE 100 MCG: 50 INJECTION, SOLUTION INTRAMUSCULAR; INTRAVENOUS at 15:42

## 2019-01-08 RX ADMIN — Medication 2 G: at 15:49

## 2019-01-08 RX ADMIN — ROCURONIUM BROMIDE 35 MG: 10 INJECTION, SOLUTION INTRAVENOUS at 15:46

## 2019-01-08 RX ADMIN — Medication 10 ML: at 22:00

## 2019-01-08 RX ADMIN — DEXMEDETOMIDINE HYDROCHLORIDE 6 MCG: 4 INJECTION, SOLUTION INTRAVENOUS at 16:25

## 2019-01-08 RX ADMIN — MIDAZOLAM HYDROCHLORIDE 2 MG: 1 INJECTION, SOLUTION INTRAMUSCULAR; INTRAVENOUS at 15:34

## 2019-01-08 RX ADMIN — MORPHINE SULFATE 2 MG: 10 INJECTION INTRAVENOUS at 19:19

## 2019-01-08 RX ADMIN — SODIUM CHLORIDE, SODIUM LACTATE, POTASSIUM CHLORIDE, AND CALCIUM CHLORIDE 125 ML/HR: 600; 310; 30; 20 INJECTION, SOLUTION INTRAVENOUS at 19:06

## 2019-01-08 RX ADMIN — ONDANSETRON 4 MG: 2 INJECTION INTRAMUSCULAR; INTRAVENOUS at 13:38

## 2019-01-08 RX ADMIN — FENTANYL CITRATE 100 MCG: 50 INJECTION, SOLUTION INTRAMUSCULAR; INTRAVENOUS at 16:03

## 2019-01-08 RX ADMIN — ONDANSETRON 4 MG: 2 INJECTION INTRAMUSCULAR; INTRAVENOUS at 18:29

## 2019-01-08 RX ADMIN — LIDOCAINE HYDROCHLORIDE 60 MG: 20 INJECTION, SOLUTION EPIDURAL; INFILTRATION; INTRACAUDAL; PERINEURAL at 15:42

## 2019-01-08 RX ADMIN — DOCUSATE SODIUM 100 MG: 100 CAPSULE, LIQUID FILLED ORAL at 21:35

## 2019-01-08 RX ADMIN — FENTANYL CITRATE 25 MCG: 50 INJECTION INTRAMUSCULAR; INTRAVENOUS at 18:43

## 2019-01-08 RX ADMIN — NEOSTIGMINE METHYLSULFATE 3.5 MG: 1 INJECTION INTRAVENOUS at 17:39

## 2019-01-08 RX ADMIN — KETOROLAC TROMETHAMINE 30 MG: 30 INJECTION, SOLUTION INTRAMUSCULAR; INTRAVENOUS at 17:40

## 2019-01-08 RX ADMIN — FENTANYL CITRATE 25 MCG: 50 INJECTION INTRAMUSCULAR; INTRAVENOUS at 18:25

## 2019-01-08 RX ADMIN — SODIUM CHLORIDE, SODIUM LACTATE, POTASSIUM CHLORIDE, AND CALCIUM CHLORIDE 125 ML/HR: 600; 310; 30; 20 INJECTION, SOLUTION INTRAVENOUS at 11:38

## 2019-01-08 RX ADMIN — ACETAMINOPHEN 1000 MG: 10 INJECTION, SOLUTION INTRAVENOUS at 15:49

## 2019-01-08 RX ADMIN — ONDANSETRON 4 MG: 2 INJECTION INTRAMUSCULAR; INTRAVENOUS at 06:48

## 2019-01-08 RX ADMIN — MORPHINE SULFATE 4 MG: 10 INJECTION INTRAVENOUS at 18:54

## 2019-01-08 RX ADMIN — MORPHINE SULFATE 2 MG: 10 INJECTION INTRAVENOUS at 19:25

## 2019-01-08 RX ADMIN — FENTANYL CITRATE 25 MCG: 50 INJECTION INTRAMUSCULAR; INTRAVENOUS at 18:47

## 2019-01-08 RX ADMIN — DEXAMETHASONE SODIUM PHOSPHATE 8 MG: 4 INJECTION, SOLUTION INTRA-ARTICULAR; INTRALESIONAL; INTRAMUSCULAR; INTRAVENOUS; SOFT TISSUE at 15:50

## 2019-01-08 RX ADMIN — DEXMEDETOMIDINE HYDROCHLORIDE 4 MCG: 4 INJECTION, SOLUTION INTRAVENOUS at 16:20

## 2019-01-08 RX ADMIN — FENTANYL CITRATE 25 MCG: 50 INJECTION INTRAMUSCULAR; INTRAVENOUS at 18:35

## 2019-01-08 RX ADMIN — MIDAZOLAM 0.5 MG: 1 INJECTION INTRAMUSCULAR; INTRAVENOUS at 19:00

## 2019-01-08 RX ADMIN — MIDAZOLAM 0.5 MG: 1 INJECTION INTRAMUSCULAR; INTRAVENOUS at 18:52

## 2019-01-08 RX ADMIN — KETOROLAC TROMETHAMINE 30 MG: 30 INJECTION, SOLUTION INTRAMUSCULAR at 06:48

## 2019-01-08 RX ADMIN — KETOROLAC TROMETHAMINE 30 MG: 30 INJECTION, SOLUTION INTRAMUSCULAR at 13:38

## 2019-01-08 RX ADMIN — SCOLOPAMINE TRANSDERMAL SYSTEM 1 PATCH: 1 PATCH, EXTENDED RELEASE TRANSDERMAL at 15:34

## 2019-01-08 RX ADMIN — ONDANSETRON 4 MG: 2 INJECTION INTRAMUSCULAR; INTRAVENOUS at 20:31

## 2019-01-08 RX ADMIN — MIDAZOLAM 0.5 MG: 1 INJECTION INTRAMUSCULAR; INTRAVENOUS at 19:13

## 2019-01-08 RX ADMIN — Medication: at 19:01

## 2019-01-08 RX ADMIN — KETOROLAC TROMETHAMINE 30 MG: 30 INJECTION, SOLUTION INTRAMUSCULAR at 21:36

## 2019-01-08 RX ADMIN — ONDANSETRON 4 MG: 2 INJECTION INTRAMUSCULAR; INTRAVENOUS at 17:52

## 2019-01-08 RX ADMIN — GLYCOPYRROLATE 0.5 MG: 0.2 INJECTION INTRAMUSCULAR; INTRAVENOUS at 17:39

## 2019-01-08 RX ADMIN — FENTANYL CITRATE 25 MCG: 50 INJECTION, SOLUTION INTRAMUSCULAR; INTRAVENOUS at 17:58

## 2019-01-08 RX ADMIN — KETOROLAC TROMETHAMINE 30 MG: 30 INJECTION, SOLUTION INTRAMUSCULAR at 17:40

## 2019-01-08 RX ADMIN — SODIUM CHLORIDE, SODIUM LACTATE, POTASSIUM CHLORIDE, CALCIUM CHLORIDE: 600; 310; 30; 20 INJECTION, SOLUTION INTRAVENOUS at 17:12

## 2019-01-08 RX ADMIN — SODIUM CHLORIDE, SODIUM LACTATE, POTASSIUM CHLORIDE, CALCIUM CHLORIDE: 600; 310; 30; 20 INJECTION, SOLUTION INTRAVENOUS at 15:31

## 2019-01-08 RX ADMIN — IBUPROFEN 800 MG: 400 TABLET, FILM COATED ORAL at 20:31

## 2019-01-08 RX ADMIN — FENTANYL CITRATE 25 MCG: 50 INJECTION, SOLUTION INTRAMUSCULAR; INTRAVENOUS at 17:55

## 2019-01-08 NOTE — PHYSICIAN ADVISORY
Letter of Status Determination:  
Recommend hospitalization status upgraded from OBSERVATION  to INPATIENT  Status Pt Name:  Marya Fofana MR#  
TANYA # [id number] / 
[id number] Payor: Kings Herrera / Plan: [facility] PPO / Product Type: PPO /   
CSN#  [id number] Room and Hospital  317/01  @ [facility]  
Hospitalization date  1/6/2019 12:10 PM  
Current Attending Physician  Diego Villanueva,   
Principal diagnosis  abd pain Clinicals 20year old female no medical hx presenting for abdominal pain. Pt notes that she had a known ovarian cyst.  Was seen in the ED in [place] yesterday. Had US, CT scan, UA, blood work, had CT showing normal appendix, mild leukocytosis, US showing right ovarian cyst with good flow and uterine fibroid. Pt notes that she had similar bouts of pain around [place] Trac[place]. Pt notes that current bout of pain started yesterday  AM at about 0100. Pt notes that pain is right pelvic in location, \"like a ba\d prolonged period cramp. \"  + N/V. Sent home with pain and nausea medications, not helping. LMP: 2-3 weeks ago. Pt NPO on IVF, continues to have intractable pain, continues N/V, PT SCHEDULED FOR OPEN MYOMECTOMY AND OVARIAN CYSTECTOMY  
  
 
  
[place] (MCG) criteria Does  NOT apply STATUS DETERMINATION  Inpatient The final decision of the patient's hospitalization status depends on the attending physician's judgment Additional comments Payor: Kings Herrera / Plan: [facility] PPO / Product Type: PPO /   
  
 
Brian Allen MD 
Cell: [phone] Physician Advisor

## 2019-01-08 NOTE — BRIEF OP NOTE
BRIEF OPERATIVE NOTE Date of Procedure: 1/8/2019 Preoperative Diagnosis: uterine fibroid/ ovarian cyst 
Postoperative Diagnosis: uterine fibroid/ ovarian cyst   
Procedure(s): OPEN ABDOMINAL MYOMECTOMY Surgeon(s) and Role: 
   * Julia Peres, DO - Primary Celsa Alston MD - Co-Surgeon Surgical Assistant: Jelena Soas Surgical Staff: 
Circ-1: Omar Garcia Circ-2: Julai Mendiola Scrub Tech-Relief: Kya Guillaume Scrub RN-1: Marlene Ware RN Surg Asst-1: Ritta Loose Event Time In Time Out Incision Start 01/08/2019 1605 Incision Close 01/08/2019 1757 Anesthesia: General  
Estimated Blood Loss: 250cc Specimens:  
ID Type Source Tests Collected by Time Destination 1 : UTERINE FIBROIDS Fresh Uterine  Boyle DO Victorino 1/8/2019 1707 Pathology Findings: Benign appearing right ovarian cyst- simple. Normal appearing appendix. Uterus with large anterior subserosal/ intramural fibroid- 9+ cm and a right lateral fibroid that was about 4-5cm. Neither were degenerative appearing. Complications: none Implants: * No implants in log *

## 2019-01-08 NOTE — PROGRESS NOTES
Gynecology Note Name: Gianfranco Yeung MRN: [id number] SSN: [id number] YOB: 1995  Age: 20 y.o. Sex: female Subjective: Chief complaint:  Abdominal pain, uterine fibroids Pierre OVERTON is a 20 y. o.  female with a history of uterine fibroids. Ultrasound include fibroids, right ovarian cyst. Previous treatment measures include none. The current method of family planning is none. History reviewed. No pertinent past medical history. History reviewed. No pertinent surgical history. OB History No data available No Known Allergies Current Facility-Administered Medications Medication Dose Route Frequency Provider Last Rate Last Dose  ketorolac (TORADOL) injection 30 mg  30 mg IntraVENous Q6H Yolanda Boothe MD   30 mg at 01/08/19 4190  norethindrone-ethinyl estradiol-iron (MICROGESTIN FE1.5/30) 1.5 mg-30 mcg (21)/75 mg (7) tablet tab 1 Tab (Patient Supplied)  1 Tab Oral DAILY Yolanda Boothe MD   1 Tab at 01/07/19 1846  ibuprofen (MOTRIN) tablet 800 mg  800 mg Oral Q6H PRN David Boyer MD      
 HYDROmorphone (DILAUDID) injection 1 mg  1 mg IntraVENous Q3H PRN David Boyer MD   1 mg at 01/07/19 9075  lactated Ringers infusion  125 mL/hr IntraVENous CONTINUOUS David Boyer  mL/hr at 01/08/19 0318 125 mL/hr at 01/08/19 6128  ondansetron (ZOFRAN) injection 4 mg  4 mg IntraVENous Q4H PRN David Boyer MD   4 mg at 01/08/19 0978 History reviewed. No pertinent family history. Social History Socioeconomic History  Marital status: SINGLE Spouse name: Not on file  Number of children: Not on file  Years of education: Not on file  Highest education level: Not on file Social Needs  Financial resource strain: Not on file  Food insecurity - worry: Not on file  Food insecurity - inability: Not on file  Transportation needs - medical: Not on file  Transportation needs - non-medical: Not on file Occupational History  Not on file Tobacco Use  Smoking status: Never Smoker  Smokeless tobacco: Never Used Substance and Sexual Activity  Alcohol use: No  
  Frequency: Never  Drug use: Not on file  Sexual activity: Not on file Other Topics Concern  Not on file Social History Narrative  Not on file Review of Systems: A comprehensive review of systems was negative except for that written in the History of Present Illness. Objective:  
 
Vitals:  
 01/07/19 0820 01/07/19 1607 01/07/19 1947 01/08/19 8302 BP: 119/80 119/82 130/89 114/78 Pulse: 88 79 84 91 Resp: 16 16 14 14 Temp: 97.7 °F (36.5 °C) 98.1 °F (36.7 °C) 98.5 °F (36.9 °C) 98.3 °F (36.8 °C) SpO2: 95% 99% 98% 98% General:  alert, cooperative, no distress, appears stated age Skin:  Normal.  
   
   
   
Lungs:  clear to auscultation bilaterally Heart:  regular rate and rhythm, S1, S2 normal, no murmur, click, rub or gallop Abdomen: Tender to palpation in lower abdomen. Bowel sounds normal. No masses,  no organomegaly CVA:  absent Genitourinary: defer exam  
Extremities:  extremities normal, atraumatic, no cyanosis or edema Neurologic:  negative Psychiatric:  depressed Assessment:  
 
Fibroids and Right Ovarian cyst  with abdominal pain Plan:  
 
Procedure(s) (LRB): OPEN MYOMECTOMY/ POSSIBLE OVARIAN CYSTECTOMY/ RM. 317/ REQ. TF (Right) Discussed the risks of surgery including the risks of bleeding, infection, deep vein thrombosis, and surgical injuries to internal organs including but not limited to the bowels, bladder, rectum, and female reproductive organs. The patient understands the risks; any and all questions were answered to the patient's satisfaction. Signed By:  Monica Lockwood DO   
 January 8, 2019

## 2019-01-08 NOTE — ANESTHESIA PREPROCEDURE EVALUATION
Anesthetic History No history of anesthetic complications Review of Systems / Medical History Patient summary reviewed, nursing notes reviewed and pertinent labs reviewed Pulmonary Within defined limits Neuro/Psych Within defined limits Cardiovascular Within defined limits Exercise tolerance: >4 METS 
  
GI/Hepatic/Renal 
Within defined limits Endo/Other Within defined limits Other Findings Physical Exam 
 
Airway Mallampati: II 
TM Distance: > 6 cm Neck ROM: normal range of motion Mouth opening: Normal 
 
 Cardiovascular Regular rate and rhythm,  S1 and S2 normal,  no murmur, click, rub, or gallop Dental 
No notable dental hx Pulmonary Breath sounds clear to auscultation Abdominal 
GI exam deferred Other Findings Anesthetic Plan ASA: 2 Anesthesia type: general 
 
 
 
 
Induction: Intravenous Anesthetic plan and risks discussed with: Patient

## 2019-01-08 NOTE — PROGRESS NOTES
Bedside and Verbal shift change report given to [address] Lorenzo Flower (oncoming nurse) by Solange Coates RN (offgoing nurse). Report included the following information SBAR, Kardex, OR Summary, Procedure Summary, Intake/Output and MAR. TRANSFER - OUT REPORT: 
 
Verbal report given to Amy Perez RN(name) on [facility] [employer]  being transferred to PreOp(unit) for ordered procedure Report consisted of patients Situation, Background, Assessment and  
Recommendations(SBAR). Information from the following report(s) SBAR, Kardex, OR Summary, Procedure Summary, Intake/Output and MAR was reviewed with the receiving nurse. Lines:  
Peripheral IV 01/07/19 Anterior; Left Wrist (Active) Site Assessment Clean, dry, & intact 1/8/2019  8:07 AM  
Phlebitis Assessment 0 1/8/2019  8:07 AM  
Infiltration Assessment 0 1/8/2019  8:07 AM  
Dressing Status Clean, dry, & intact 1/8/2019  8:07 AM  
Dressing Type Transparent;Tape 1/8/2019  8:07 AM  
Hub Color/Line Status Infusing 1/8/2019  8:07 AM  
Action Taken Open ports on tubing capped 1/8/2019  8:07 AM  
Alcohol Cap Used Yes 1/8/2019  8:07 AM  
  
 
Opportunity for questions and clarification was provided. Patient transported with: 
 [employer]

## 2019-01-08 NOTE — PROGRESS NOTES
Spiritual Care Partner Volunteer visited patient in room 317 on 1.08.19. Documented by: : Rev. Sandeep Foss. Rayna Saldaña; [facility], to contact [id number] call: [phone]

## 2019-01-08 NOTE — PROGRESS NOTES
Bedside and Verbal shift change report given to Jace Smith RN (oncoming nurse) by Tiago Anton RN (offgoing nurse). Report included the following information SBAR, Kardex, Procedure Summary, Intake/Output, MAR, Accordion and Recent Results.

## 2019-01-08 NOTE — ROUTINE PROCESS
TRANSFER - IN REPORT: 
 
Verbal report received from Nancy Bolden RN(name) on [facility] [employer]  being received from [facility](unit) for ordered procedure Report consisted of patients Situation, Background, Assessment and  
Recommendations(SBAR). Information from the following report(s) SBAR, Kardex, ED Summary, Procedure Summary, Intake/Output, MAR and Recent Results was reviewed with the receiving nurse. Opportunity for questions and clarification was provided. Assessment completed upon patients arrival to unit and care assumed.

## 2019-01-08 NOTE — PERIOP NOTES
Patient: Julianna Sidhu MRN: [id number]  SSN: [id number] YOB: 1995  Age: 20 y.o. Sex: female Patient is status post Procedure(s): OPEN ABDOMINAL MYOMECTOMY. Surgeon(s) and Role: 
   * Julia Peres, DO - Primary Juan Ramon Marshall MD - Co-Surgeon Local/Dose/Irrigation: NO LOCAL. Saline irrigation to sterile field. Peripheral IV 01/07/19 Anterior; Left Wrist (Active) Site Assessment Clean, dry, & intact 1/8/2019  8:07 AM  
Phlebitis Assessment 0 1/8/2019  8:07 AM  
Infiltration Assessment 0 1/8/2019  8:07 AM  
Dressing Status Clean, dry, & intact 1/8/2019  8:07 AM  
Dressing Type Transparent;Tape 1/8/2019  8:07 AM  
Hub Color/Line Status Infusing 1/8/2019  8:07 AM  
Action Taken Open ports on tubing capped 1/8/2019  8:07 AM  
Alcohol Cap Used Yes 1/8/2019  8:07 AM  
        
Airway - Endotracheal Tube 01/08/19 Oral (Active) Dressing/Packing:  Wound Abdomen-DRESSING TYPE: Adhesive wound closure strips (Steri-Strips); Adhesive wound dressing (Mastisol);4 x 4;Special tape (comment) (01/08/19 1700) Splint/Cast:  ] Other:  Khushi Soto

## 2019-01-09 LAB
C TRACH DNA SPEC QL NAA+PROBE: NEGATIVE
HCT VFR BLD AUTO: 35.9 % (ref 35–47)
HGB BLD-MCNC: 11.9 G/DL (ref 11.5–16)
N GONORRHOEA DNA SPEC QL NAA+PROBE: NEGATIVE
SAMPLE TYPE: NORMAL
SERVICE CMNT-IMP: NORMAL
SPECIMEN SOURCE: NORMAL

## 2019-01-09 PROCEDURE — 65410000002 HC RM PRIVATE OB

## 2019-01-09 PROCEDURE — 36415 COLL VENOUS BLD VENIPUNCTURE: CPT

## 2019-01-09 PROCEDURE — 74011250637 HC RX REV CODE- 250/637: Performed by: OBSTETRICS & GYNECOLOGY

## 2019-01-09 PROCEDURE — 74011250636 HC RX REV CODE- 250/636: Performed by: OBSTETRICS & GYNECOLOGY

## 2019-01-09 PROCEDURE — 85018 HEMOGLOBIN: CPT

## 2019-01-09 RX ORDER — OXYCODONE AND ACETAMINOPHEN 5; 325 MG/1; MG/1
2 TABLET ORAL
Status: DISCONTINUED | OUTPATIENT
Start: 2019-01-09 | End: 2019-01-10 | Stop reason: HOSPADM

## 2019-01-09 RX ORDER — OXYCODONE AND ACETAMINOPHEN 5; 325 MG/1; MG/1
1 TABLET ORAL
Status: DISCONTINUED | OUTPATIENT
Start: 2019-01-09 | End: 2019-01-10 | Stop reason: HOSPADM

## 2019-01-09 RX ADMIN — ONDANSETRON 4 MG: 2 INJECTION INTRAMUSCULAR; INTRAVENOUS at 15:05

## 2019-01-09 RX ADMIN — SODIUM CHLORIDE, SODIUM LACTATE, POTASSIUM CHLORIDE, AND CALCIUM CHLORIDE 125 ML/HR: 600; 310; 30; 20 INJECTION, SOLUTION INTRAVENOUS at 02:25

## 2019-01-09 RX ADMIN — OXYCODONE AND ACETAMINOPHEN 2 TABLET: 5; 325 TABLET ORAL at 08:47

## 2019-01-09 RX ADMIN — DOCUSATE SODIUM 100 MG: 100 CAPSULE, LIQUID FILLED ORAL at 08:39

## 2019-01-09 RX ADMIN — ONDANSETRON 4 MG: 2 INJECTION INTRAMUSCULAR; INTRAVENOUS at 19:34

## 2019-01-09 RX ADMIN — IBUPROFEN 800 MG: 400 TABLET, FILM COATED ORAL at 21:16

## 2019-01-09 RX ADMIN — NORETHINDRONE ACETATE AND ETHINYL ESTRADIOL 1 TABLET: KIT at 08:39

## 2019-01-09 RX ADMIN — IBUPROFEN 800 MG: 400 TABLET, FILM COATED ORAL at 15:02

## 2019-01-09 RX ADMIN — Medication 10 ML: at 06:00

## 2019-01-09 RX ADMIN — OXYCODONE AND ACETAMINOPHEN 2 TABLET: 5; 325 TABLET ORAL at 17:22

## 2019-01-09 RX ADMIN — HYDROMORPHONE HYDROCHLORIDE 1 MG: 2 INJECTION INTRAMUSCULAR; INTRAVENOUS; SUBCUTANEOUS at 02:22

## 2019-01-09 RX ADMIN — HYDROMORPHONE HYDROCHLORIDE 1 MG: 2 INJECTION INTRAMUSCULAR; INTRAVENOUS; SUBCUTANEOUS at 05:14

## 2019-01-09 RX ADMIN — ONDANSETRON 4 MG: 2 INJECTION INTRAMUSCULAR; INTRAVENOUS at 03:58

## 2019-01-09 RX ADMIN — OXYCODONE AND ACETAMINOPHEN 2 TABLET: 5; 325 TABLET ORAL at 13:06

## 2019-01-09 RX ADMIN — Medication 10 ML: at 21:16

## 2019-01-09 RX ADMIN — ONDANSETRON 4 MG: 2 INJECTION INTRAMUSCULAR; INTRAVENOUS at 08:39

## 2019-01-09 RX ADMIN — DOCUSATE SODIUM 100 MG: 100 CAPSULE, LIQUID FILLED ORAL at 17:22

## 2019-01-09 RX ADMIN — Medication 10 ML: at 15:07

## 2019-01-09 RX ADMIN — KETOROLAC TROMETHAMINE 30 MG: 30 INJECTION, SOLUTION INTRAMUSCULAR at 03:58

## 2019-01-09 NOTE — PROGRESS NOTES
Gynecology Progress Note Patient is post-op day 1 from Procedure(s): OPEN ABDOMINAL MYOMECTOMY complainting of dizziness and nausea. She has not vomited. Pain controlled on current medication. Brewer draining clear urine to gravity. Patient is not passing flatus. Denies fever, chills, SOB, chest pain. Vitals:  Blood pressure 120/81, pulse 74, temperature 98.2 °F (36.8 °C), resp. rate 14, height 5' 6\" (1.676 m), weight 88.3 kg (194 lb 9.6 oz), SpO2 95 %. Temp (24hrs), Av.5 °F (36.9 °C), Min:97.3 °F (36.3 °C), Max:98.9 °F (37.2 °C) Exam:  Patient without distress. Abdomen soft,  Nontender. Incision dressing dry and clean without erythema- area of strikethrough that has not spread. Lower extremities are negative for swelling, cords, or tenderness. Lab/Data Review: CBC:  
Lab Results Component Value Date/Time HGB 11.9 2019 04:10 AM  
 HCT 35.9 2019 04:10 AM  
 
 
Assessment and Plan:  Patient appears to be having uncomplicated post Procedure(s): OPEN ABDOMINAL MYOMECTOMY course. Advance diet as tolerated. Will plan to start PO pain meds once pt tolerating diet. Discontinue brewer and IVF per protocol. Zofran PRN. Encourage ambulation. Encourage use of incentive spirometer. Continue routine post-op care.

## 2019-01-09 NOTE — ANESTHESIA POSTPROCEDURE EVALUATION
Procedure(s): OPEN ABDOMINAL MYOMECTOMY. Anesthesia Post Evaluation Patient location during evaluation: PACU Patient participation: complete - patient participated Level of consciousness: awake and alert Pain management: adequate Airway patency: patent Anesthetic complications: no 
Cardiovascular status: acceptable Respiratory status: acceptable Hydration status: acceptable Comments: I have seen and evaluated the patient and is ready for discharge. Jovany Landers MD 
 
Post anesthesia nausea and vomiting:  none Visit Vitals /83 Pulse 90 Temp 37 °C (98.6 °F) Resp 12 Ht 5' 6\" (1.676 m) Wt 88.3 kg (194 lb 9.6 oz) SpO2 95% BMI 31.41 kg/m²

## 2019-01-09 NOTE — PROGRESS NOTES
Bedside and Verbal shift change report given to Daniela Saab RN (oncoming nurse) by Fred Voss RN (offgoing nurse). Report included the following information SBAR, Kardex, OR Summary, Procedure Summary, Intake/Output, MAR and Recent Results.

## 2019-01-09 NOTE — OP NOTES
OPERATIVE NOTE    Date of Procedure: 1/8/2019   Preoperative Diagnosis: uterine fibroid/ ovarian cyst  Postoperative Diagnosis: uterine fibroid/ ovarian cyst    Procedure: Procedure(s):  OPEN ABDOMINAL MYOMECTOMY    Surgeon: Ivan Kumar DO  Assistant(s): Surgical assist on duty and Елена Pak MD   Anesthesia: General   Estimated Blood Loss: 250cc  IVF: 1500cc  UOP: 850cc  Specimens:   ID Type Source Tests Collected by Time Destination   1 : UTERINE FIBROIDS Fresh Uterine  Julia Peres,  1/8/2019 1707 Pathology      Findings: Uterus with large uterine fibroid in the anterior aspect- approx 9cm in size. Another uterine fibroid on the right lateral aspect towards the broad ligament. Benign appearing right ovarian cyst. Normal appearing appendix. Otherwise normal appearing left ovary, bilateral fallopian tubes. Complications: none    Specimen: Two uterine fibroids, resected uterine serosa      Procedure Detail:  After thorough counseling and consent, the patient was taken to the operating room where she was placed in the supine position and underwent general endotracheal anesthesia without incident. She received 2 gm of Ancef for surgical prophylaxis. SCDs were placed. She was prepped and draped in the usual sterile fashion. A Pfannenstiel incision was then made with a scalpel and carried down to the underlying fascia with the bovie cautery. The fascia was incised at the midline with a scalpel and extended laterally with the use of pick ups and arnold scissors. The superior part of the fascia was tented up with kocher clamps and the rectus muscles were dissected off underneath with the use of cautery and curved arnold scissors. The same technique was used for the inferior fascia. The peritoneum was identified at the midline and was entered bluntly with careful attention to the bowel and bladder. The peritoneum was extended inferiorly and superiorly with the use of the mets scissors.  The bowel was then packed with the use of two damp laps. The uterus was able to be extended up through the incision and the findings were noted as above. Vasopressin was then injected into the uterine serosa overlying the uterine fibroid. Incision was made on the uterine serosa with the bovie and carried down to the layer of the fibroid. The large fibroid was then shelled out with the use of mets scissors, cautery, and blunt dissection. The same technique was then used to remove the other more lateral fibroid. The removal of the large fibroid on the anterior aspect of the uterus left a large open area under the uterine serosa with a small amount of intramural space. The excess serosa was then trimmed off so that it could be reapproximated edge to edge. Both areas of defect were repaired with 2-0 chromic in the deepest layer with interrupted sutures, 0 monocryl locking suture for reapproximation and hemostasis, and then 2-0 Monocryl for a baseball stitch of the serosa. Both areas were covered with interceed for prevention of adhesions. The paracolic gutters and pelvis were cleared of all blood with the suction device. The fascia was then reapproximated with 0 vicryl running suture. The subcutaneous fat was reapproximated with 3-0 vicryl running, and the skin was closed with 4-0 monocryl in a subcuticular fashion. The incision was covered with mastisol and steristrips and a sterile dressing. Dejesus catheter was left in place postoperatively. Pt was awakened and extubated in the OR and transferred to the PACU in stable condition. Sponge, instrument, and needle counts were correct x 2. Pt tolerated the procedure well.

## 2019-01-09 NOTE — PROGRESS NOTES
Bedside and Verbal shift change report given to Yudy Drake (oncoming nurse) by Socrates Hernandez (offgoing nurse). Report included the following information SBAR, Kardex, Intake/Output, MAR and Recent Results. 1115: pt tolerating PO pain medicine. Ambulating in hallway. Dejesus removed.

## 2019-01-09 NOTE — PERIOP NOTES
TRANSFER - OUT REPORT: 
 
Verbal report given to [address] RN Miryam(name) on [employer]  being transferred to [facility](unit) for routine post - op Report consisted of patients Situation, Background, Assessment and  
Recommendations(SBAR). Time Pre op antibiotic [id number] Anesthesia Stop time: 5223 Dejesus Present on Transfer to floor:y Order for Dejesus on Chart:y 
Discharge Prescriptions with Chart:n Information from the following report(s) SBAR, OR Summary, Intake/Output, MAR and Cardiac Rhythm NSR was reviewed with the receiving nurse. Opportunity for questions and clarification was provided. Is the patient on 02? NO 
     L/Min Other Is the patient on a monitor? NO Is the nurse transporting with the patient? NO Surgical Waiting Area notified of patient's transfer from PACU? YES The following personal items collected during your admission accompanied patient upon transfer:  
Dental Appliance: Dental Appliances: None Vision: Visual Aid: Glasses Hearing Aid:   
Jewelry: Jewelry: (all jewlery left at bedside) Clothing: Clothing: (NO BELONGINGS IN PACU; ALL LEFT AT BEDSIDE IN ROOM 317) Other Valuables: Other Valuables: Eyeglasses(left at bedside) Valuables sent to safe:

## 2019-01-10 VITALS
DIASTOLIC BLOOD PRESSURE: 72 MMHG | BODY MASS INDEX: 31.27 KG/M2 | OXYGEN SATURATION: 97 % | HEIGHT: 66 IN | SYSTOLIC BLOOD PRESSURE: 113 MMHG | RESPIRATION RATE: 18 BRPM | WEIGHT: 194.6 LBS | HEART RATE: 83 BPM | TEMPERATURE: 98.3 F

## 2019-01-10 PROCEDURE — 74011250636 HC RX REV CODE- 250/636: Performed by: OBSTETRICS & GYNECOLOGY

## 2019-01-10 PROCEDURE — 74011250637 HC RX REV CODE- 250/637: Performed by: OBSTETRICS & GYNECOLOGY

## 2019-01-10 RX ORDER — IBUPROFEN 800 MG/1
800 TABLET ORAL
Qty: 30 TAB | Refills: 1 | Status: SHIPPED | OUTPATIENT
Start: 2019-01-10

## 2019-01-10 RX ORDER — ONDANSETRON 4 MG/1
4 TABLET, ORALLY DISINTEGRATING ORAL
Qty: 20 TAB | Refills: 1 | Status: SHIPPED | OUTPATIENT
Start: 2019-01-10

## 2019-01-10 RX ORDER — OXYCODONE AND ACETAMINOPHEN 5; 325 MG/1; MG/1
1 TABLET ORAL
Qty: 30 TAB | Refills: 0 | Status: SHIPPED | OUTPATIENT
Start: 2019-01-10

## 2019-01-10 RX ORDER — DOCUSATE SODIUM 100 MG/1
100 CAPSULE, LIQUID FILLED ORAL
Qty: 60 CAP | Refills: 2 | Status: SHIPPED | OUTPATIENT
Start: 2019-01-10 | End: 2019-04-10

## 2019-01-10 RX ADMIN — DOCUSATE SODIUM 100 MG: 100 CAPSULE, LIQUID FILLED ORAL at 09:44

## 2019-01-10 RX ADMIN — Medication 10 ML: at 06:00

## 2019-01-10 RX ADMIN — ONDANSETRON 4 MG: 2 INJECTION INTRAMUSCULAR; INTRAVENOUS at 07:31

## 2019-01-10 RX ADMIN — IBUPROFEN 800 MG: 400 TABLET, FILM COATED ORAL at 07:49

## 2019-01-10 RX ADMIN — ONDANSETRON 4 MG: 2 INJECTION INTRAMUSCULAR; INTRAVENOUS at 00:08

## 2019-01-10 RX ADMIN — NORETHINDRONE ACETATE AND ETHINYL ESTRADIOL 1 TABLET: KIT at 07:50

## 2019-01-10 NOTE — DISCHARGE INSTRUCTIONS
Abdominal Myomectomy: What to Expect at [facility] can expect to feel better and stronger each day, although you may tire quickly and need pain medicine for a week or two. You may need about 4 to 6 weeks to fully recover. Do not lift anything heavy while you are recovering so that your incision and your belly muscles can heal.  This care sheet gives you a general idea about how long it will take for you to recover. But each person recovers at a different pace. Follow the steps below to get better as quickly as possible. How can you care for yourself at home? Activity    · Rest when you feel tired. Getting enough sleep will help you recover.     · Try to walk each day. Start out by walking a little more than you did the day before. Bit by bit, increase the amount you walk. Walking boosts blood flow and helps prevent pneumonia and constipation.     · For 4 to 6 weeks, avoid lifting anything that would make you strain. This may include a child, heavy grocery bags and milk containers, a heavy briefcase or backpack, cat litter or dog food bags, or a vacuum .     · Avoid strenuous activities, such as biking, jogging, weightlifting, and aerobic exercise, for 4 to 6 weeks.     · You may shower. Pat the incision dry when you are done. Do not take a bath for the first week after surgery or until your doctor tells you it is okay.     · You may have some light vaginal bleeding. Wear sanitary pads if needed. Do not douche or use tampons.     · Ask your doctor when you can drive again.     · You will probably need to take 2 to 4 weeks off work. It depends on the type of work you do and how you feel.     · Do not have sex until your doctor tells you it is okay.     · Talk about birth control with your doctor. Do not try to become pregnant until your doctor says it is okay. Diet    · You can eat your normal diet.  If your stomach is upset, try bland, low-fat foods like plain rice, broiled chicken, toast, and yogurt.     · Drink plenty of fluids (unless your doctor tells you not to).     · You may notice that your bowel movements are not regular right after your surgery. This is common. Try to avoid constipation and straining with bowel movements. You may want to take a fiber supplement every day. If you have not had a bowel movement after a couple of days, ask your doctor about taking a mild laxative. Medicines    · Your doctor will tell you if and when you can restart your medicines. He or she will also give you instructions about taking any new medicines.     · If you take blood thinners, such as warfarin (Coumadin), clopidogrel (Plavix), or aspirin, be sure to talk to your doctor. He or she will tell you if and when to start taking those medicines again. Make sure that you understand exactly what your doctor wants you to do.     · Take pain medicines exactly as directed. ? If the doctor gave you a prescription medicine for pain, take it as prescribed. ? If you are not taking a prescription pain medicine, take an over-the-counter medicine such as acetaminophen (Tylenol), ibuprofen (Advil, Motrin), or naproxen (Aleve). Read and follow all instructions on the label. Do not take two or more pain medicines at the same time unless the doctor told you to. Many pain medicines contain acetaminophen, which is Tylenol. Too much acetaminophen (Tylenol) can be harmful.     · If you think your pain medicine is making you sick to your stomach:  ? Take your medicine after meals (unless your doctor tells you not to). ? Ask your doctor for a different pain medicine.     · If your doctor prescribed antibiotics, take them as directed. Do not stop taking them just because you feel better. You need to take the full course of antibiotics.    Incision care    · If you have strips of tape on the cut (incision) the doctor made, leave the tape on for a week or until it falls off.     · Wash the area daily with warm, soapy water and pat it dry.     · Keep the area clean and dry. You may cover it with a gauze bandage if it weeps or rubs against clothing. Change the bandage every day. Other instructions    · Wear loose, comfortable clothing and avoid anything that puts pressure on your belly for a few weeks. Follow-up care is a key part of your treatment and safety. Be sure to make and go to all appointments, and call your doctor if you are having problems. It's also a good idea to know your test results and keep a list of the medicines you take. When should you call for help? Call 911 anytime you think you may need emergency care. For example, call if:    · You passed out (lost consciousness).     · You have chest pain, are short of breath, or cough up blood.    Call your doctor now or seek immediate medical care if:    · You have pain that does not get better after you take pain medicine.     · You cannot pass stools or gas.     · You have vaginal discharge that has increased in amount or smells bad.     · You are sick to your stomach or cannot drink fluids.     · You have loose stitches, or your incision comes open.     · Bright red blood has soaked through the bandage over your incision.     · You have signs of infection, such as:  ? Increased pain, swelling, warmth, or redness. ? Red streaks leading from the incision. ? Pus draining from the incision. ? A fever.     · You have bright red vaginal bleeding that soaks one or more pads in an hour, or you have large clots.     · You have signs of a blood clot in your leg (called a deep vein thrombosis), such as:  ? Pain in your calf, back of the knee, thigh, or groin. ? Redness and swelling in your leg or groin.    Watch closely for any changes in your health, and be sure to contact your doctor if you have any problems. Where can you learn more? Go to http://zuleyma-[place]or[place].info/.   Enter D107 in the search box to learn more about \"Abdominal Myomectomy: What to Expect at Home. \"  Current as of: May 15, 2018  Content Version: 11.8  © 8315-3610 [employer]. Care instructions adapted under license by [employer] (which disclaims liability or warranty for this information). If you have questions about a medical condition or this instruction, always ask your healthcare professional. [place] [address] any warranty or liability for your use of this information. Patient Education   - return for new or worsening symptoms; worsening pain, fever, persistent vomiting  - call tomorrow to make an appt to see Dr. Piotr Wisdom - tell them that you were seen in the ER and we spoke with Dr. Alcira Giles on call     Functional Ovarian Cyst: Care Instructions  Your Care Instructions    A functional ovarian cyst is a sac that forms on the surface of a woman's ovary during ovulation. The sac holds a maturing egg. Usually the sac goes away after the egg is released. But if the egg is not released, or if the sac closes up after the egg is released, the sac can swell up with fluid and form a cyst.  Functional ovarian cysts are different than ovarian growths caused by other problems, such as cancer. Most functional ovarian cysts cause no symptoms and go away on their own. Some cause mild pain. Others can cause severe pain when they rupture or bleed. Follow-up care is a key part of your treatment and safety. Be sure to make and go to all appointments, and call your doctor if you are having problems. It's also a good idea to know your test results and keep a list of the medicines you take. How can you care for yourself at home? · Use heat, such as a hot water bottle, a heating pad set on low, or a warm bath, to relax tense muscles and relieve cramping. · Be safe with medicines. Take pain medicines exactly as directed. ? If the doctor gave you a prescription medicine for pain, take it as prescribed.   ? If you are not taking a prescription pain medicine, ask your doctor if you can take an over-the-counter medicine. · Avoid constipation. Make sure you drink enough fluids and include fruits, vegetables, and fiber in your diet each day. Constipation does not cause ovarian cysts, but it may make your pelvic pain worse. When should you call for help? Call your doctor now or seek immediate medical care if:    · You have severe vaginal bleeding.     · You have new or worse belly or pelvic pain.    Watch closely for changes in your health, and be sure to contact your doctor if:    · You have unusual vaginal bleeding.     · You do not get better as expected. Where can you learn more? Go to http://zuleyma-[place]or[place].info/. Enter G595 in the search box to learn more about \"Functional Ovarian Cyst: Care Instructions. \"  Current as of: May 15, 2018  Content Version: 11.8  © 5659-0278 [employer]. Care instructions adapted under license by [employer] (which disclaims liability or warranty for this information). If you have questions about a medical condition or this instruction, always ask your healthcare professional. [place] [address] any warranty or liability for your use of this information. Patient Education        Uterine Fibroids: Care Instructions  Your Care Instructions    Uterine fibroids are growths in the uterus. Fibroids aren't cancer. Doctors don't know what causes fibroids. Fibroids are very common in women during their childbearing years. Fibroids can grow on the inside of the uterus, in the muscle wall of the uterus, or near the outside wall of the uterus. In some women, fibroids cause painful cramps and heavy periods. In these cases, taking anti-inflammatory medicines, birth control pills, or using an intrauterine device (IUD) often helps decrease symptoms. Sometimes surgery is needed to treat fibroids. But if you are near menopause, you may want to wait and see if your symptoms get better.   Most fibroids shrink and go away after menopause, when your menstrual periods stop completely. Follow-up care is a key part of your treatment and safety. Be sure to make and go to all appointments, and call your doctor if you are having problems. It's also a good idea to know your test results and keep a list of the medicines you take. How can you care for yourself at home? · If your doctor gave you medicine, take it as exactly as prescribed. Be safe with medicines. Call your doctor if you think you are having a problem with your medicine. · Take anti-inflammatory medicines for pain. These include ibuprofen (Advil, Motrin) and naproxen (Aleve). Read and follow all instructions on the label. · Use heat, such as a hot water bottle or a heating pad set on low, or a warm bath to relax tense muscles and relieve cramping. Put a thin cloth between the heating pad and your skin. Never go to sleep with a heating pad on. · Lie down and put a pillow under your knees. Or, lie on your side and bring your knees up to your chest. These positions may help relieve belly pain or pressure. · Keep track of how many sanitary pads or tampons you use each day. · Get at least 30 minutes of exercise on most days of the week. Walking is a good choice. You also may want to do other activities, such as running, swimming, cycling, or playing tennis or team sports. · If you bleed longer than usual or have heavy bleeding, take a daily multivitamin with iron. When should you call for help? Call your doctor now or seek immediate medical care if:    · You have severe vaginal bleeding.     · You have new or worse belly or pelvic pain.    Watch closely for changes in your health, and be sure to contact your doctor if:    · You have unusual vaginal bleeding.     · You do not get better as expected. Where can you learn more? Go to http://zuleyma-[place]or[place].info/.   Enter B121 in the search box to learn more about \"Uterine Fibroids: Care Instructions. \"  Current as of: May 15, 2018  Content Version: 11.8  © 9094-5953 [employer], [employer]. Care instructions adapted under license by [employer] (which disclaims liability or warranty for this information). If you have questions about a medical condition or this instruction, always ask your healthcare professional. [place] [address] any warranty or liability for your use of this information.

## 2019-01-10 NOTE — PROGRESS NOTES
2 Days Post-Op  Open myomectomy Events of surgery reviewed and questions answered. Voiding without difficulty. Passing flatus and ready to advance diet. Ambulating with assistance. Vitals: 
Visit Vitals /80 (BP 1 Location: Right arm, BP Patient Position: At rest) Pulse 95 Temp 99.2 °F (37.3 °C) Resp 16 Ht 5' 6\" (1.676 m) Wt 88.3 kg (194 lb 9.6 oz) SpO2 97% BMI 31.41 kg/m² Temp (24hrs), Av.7 °F (37.1 °C), Min:97.7 °F (36.5 °C), Max:99.2 °F (37.3 °C) Last 24hr Input/Output: 
 
Intake/Output Summary (Last 24 hours) at 1/10/2019 8052 Last data filed at 1/10/2019 0410 Gross per 24 hour Intake  Output 3560 ml Net -3560 ml Exam:   
   Patient without distress. Abdomen:soft, expected tenderness, fundus firm, wound dressing intact Lower extremities are nontender without edema. No cords Labs:  
Lab Results Component Value Date/Time WBC 9.9 2019 12:23 PM  
 HGB 11.9 2019 04:10 AM  
 HGB 12.6 2019 12:23 PM  
 HCT 35.9 2019 04:10 AM  
 HCT 37.6 2019 12:23 PM  
 PLATELET 941  12:23 PM  
 
 
No results found for this or any previous visit (from the past 24 hour(s)). Assessment: : 2 Days Post-Op s/p open myomectomy - doing well Plan: Ambulate Aggressive pulmonary toilet Advance diet Post operative instructions reviewed with patient. Follow up in 2 wks for incision check and 6 weeks for postop check

## 2019-01-10 NOTE — PROGRESS NOTES
Problem: Pressure Injury - Risk of 
Goal: *Prevention of pressure injury Document Trent Scale and appropriate interventions in the flowsheet. Outcome: Progressing Towards Goal 
Pressure Injury Interventions: Activity Interventions: Increase time out of bed Nutrition Interventions: Document food/fluid/supplement intake Problem: Falls - Risk of 
Goal: *Absence of Falls Document Ina Bernal Fall Risk and appropriate interventions in the flowsheet. Outcome: Progressing Towards Goal 
Fall Risk Interventions: 
Mobility Interventions: Communicate number of staff needed for ambulation/transfer Medication Interventions: Patient to call before getting OOB Elimination Interventions: Call light in reach

## 2019-01-10 NOTE — PROGRESS NOTES
Bedside and Verbal shift change report given to [address]  (oncoming nurse) by Víctor Bartholomew  (offgoing nurse). Report included the following information SBAR, Kardex, OR Summary, Procedure Summary, Intake/Output, MAR and Recent Results. 2030: Pt assessed. Pt states she has a headache and its coming and going. Hasn't eaten much other than cereal and a few sips of water. Pt encouraged to eat and drink, educated that a adverse effect of percocet is a headache and nausea, also educated that she is probably dehydrated. Pt drinks entire tropical smoothie with no nausea. 2116: See MAR 
0000: Pt has been asleep since medication at 2116, vitals taken. Pt easily awakens. States she kind of hurts but doesn't want anything. 0005: pt called out for nausea. RN at bedside to give medications. Pt states she thinks she's passed gas and is encouraged to get up and walk around. [id number]: Pt has been asleep 
0605: Pt ambulatory in hallway without difficulty and passed gas

## 2019-01-10 NOTE — DISCHARGE SUMMARY
Gynecology Discharge Summary     Patient ID:  Oscar Rod  [id number]  89 y.o.  1995    Admit date: 1/6/2019    Discharge date and time: No discharge date for patient encounter. Admission Diagnoses:    Patient Active Problem List   Diagnosis Code    Pelvic pain R10.2    Fibroid D21.9       Discharge Diagnoses: No discharge information exists for this patient. Patient Active Problem List   Diagnosis Code    Pelvic pain R10.2    Fibroid D21.9       Procedures for this admission: Procedure(s):  [address] Course: Pt admitted for pelvic pain and found to have uterine fibroid that was increasing in size, also with right sided ovarian cyst. Pt underwent open myomectomy and had normal postop recovery course. Disposition: Home or self care    Discharged Condition : stable    Instructions: Follow-up in office  in 2 weeks.               Denis Friend DO  1/10/2019  8:39 AM

## (undated) DEVICE — HANDLE LT SNAP ON ULT DURABLE LENS FOR TRUMPF ALC DISPOSABLE

## (undated) DEVICE — SYRINGE MED 20ML STD CLR PLAS LUERLOCK TIP N CTRL DISP

## (undated) DEVICE — MEDI-VAC NON-CONDUCTIVE SUCTION TUBING: Brand: CARDINAL HEALTH

## (undated) DEVICE — INFECTION CONTROL KIT SYS

## (undated) DEVICE — REM POLYHESIVE ADULT PATIENT RETURN ELECTRODE: Brand: VALLEYLAB

## (undated) DEVICE — DRAPE,T,LAPARO,TRANS,STERILE: Brand: MEDLINE

## (undated) DEVICE — MEDI-VAC YANK SUCT HNDL W/TPRD BULBOUS TIP: Brand: CARDINAL HEALTH

## (undated) DEVICE — NEEDLE HYPO 22GA L1.5IN BLK S STL HUB POLYPR SHLD REG BVL

## (undated) DEVICE — SUTURE VCRL 0 L27IN ABSRB UD CT L40MM 1/2 CIR TAPERPOINT J280H

## (undated) DEVICE — 3M™ STERI-STRIP™ REINFORCED ADHESIVE SKIN CLOSURES, R1546, 1/4 IN X 4 IN (6 MM X 100 MM), 10 STRIPS/ENVELOPE: Brand: 3M™ STERI-STRIP™

## (undated) DEVICE — SUTURE MCRYL SZ 0 L27IN ABSRB VLT CT-1 L36MM 1/2 CIR TAPR Y340H

## (undated) DEVICE — APPLICATOR BNDG 1MM ADH PREMIERPRO EXOFIN

## (undated) DEVICE — 1200 GUARD II KIT W/5MM TUBE W/O VAC TUBE: Brand: GUARDIAN

## (undated) DEVICE — SYR 10ML LUER LOK 1/5ML GRAD --

## (undated) DEVICE — CATH FOL TY IC BAG 16FR 2000ML -- CONVERT TO ITEM 363158

## (undated) DEVICE — SOLUTION IV 1000ML 0.9% SOD CHL

## (undated) DEVICE — 4-PORT MANIFOLD: Brand: NEPTUNE 2

## (undated) DEVICE — SOLUTION IRRIG 1000ML H2O STRL BLT

## (undated) DEVICE — STERILE POLYISOPRENE POWDER-FREE SURGICAL GLOVES WITH EMOLLIENT COATING: Brand: PROTEXIS

## (undated) DEVICE — BARRIER TISS ADH ABSRB 3X4IN -- GYNECARE INTERCEED

## (undated) DEVICE — SUTURE MCRYL SZ 3-0 L27IN ABSRB UD L24MM PS-1 3/8 CIR PRIM Y936H

## (undated) DEVICE — GAUZE SPONGES,12 PLY: Brand: CURITY

## (undated) DEVICE — SUTURE MCRYL SZ 4-0 L27IN ABSRB UD L19MM PS-2 1/2 CIR PRIM Y426H

## (undated) DEVICE — DEVON™ KNEE AND BODY STRAP 60" X 3" (1.5 M X 7.6 CM): Brand: DEVON

## (undated) DEVICE — SUTURE 2-0 L36IN ABSRB BRN CT L40MM 1/2 CIR TAPERPOINT SGL 913H

## (undated) DEVICE — ABDOMINAL PAD: Brand: DERMACEA

## (undated) DEVICE — (D)PREP SKN CHLRAPRP APPL 26ML -- CONVERT TO ITEM 371833

## (undated) DEVICE — SURGICAL PROCEDURE PACK BASIN MAJ SET CUST NO CAUT

## (undated) DEVICE — ROCKER SWITCH PENCIL BLADE ELECTRODE, HOLSTER: Brand: EDGE

## (undated) DEVICE — KENDALL SCD EXPRESS SLEEVES, KNEE LENGTH, MEDIUM: Brand: KENDALL SCD

## (undated) DEVICE — SUTURE VCRL SZ 3-0 L36IN ABSRB VLT CT L40MM 1/2 CIR J356H

## (undated) DEVICE — PACK,BASIC,SIRUS,V: Brand: MEDLINE

## (undated) DEVICE — GOWN,SIRUS,NONRNF,SETINSLV,XL,20/CS: Brand: MEDLINE

## (undated) DEVICE — TOWEL SURG W17XL27IN STD BLU COT NONFENESTRATED PREWASHED

## (undated) DEVICE — PAD SANIT NPKN 4IN GRD